# Patient Record
Sex: FEMALE | Race: BLACK OR AFRICAN AMERICAN | NOT HISPANIC OR LATINO | Employment: UNEMPLOYED | URBAN - METROPOLITAN AREA
[De-identification: names, ages, dates, MRNs, and addresses within clinical notes are randomized per-mention and may not be internally consistent; named-entity substitution may affect disease eponyms.]

---

## 2017-05-14 ENCOUNTER — HISTORICAL (OUTPATIENT)
Dept: ADMINISTRATIVE | Facility: HOSPITAL | Age: 27
End: 2017-05-14

## 2017-08-17 ENCOUNTER — HISTORICAL (OUTPATIENT)
Dept: ADMINISTRATIVE | Facility: HOSPITAL | Age: 27
End: 2017-08-17

## 2017-08-20 LAB — FINAL CULTURE: NO GROWTH

## 2017-09-01 ENCOUNTER — HISTORICAL (OUTPATIENT)
Dept: ADMINISTRATIVE | Facility: HOSPITAL | Age: 27
End: 2017-09-01

## 2017-09-03 LAB — FINAL CULTURE: NO GROWTH

## 2017-09-15 ENCOUNTER — HISTORICAL (OUTPATIENT)
Dept: ADMINISTRATIVE | Facility: HOSPITAL | Age: 27
End: 2017-09-15

## 2017-09-17 LAB — FINAL CULTURE: NO GROWTH

## 2018-02-10 ENCOUNTER — HISTORICAL (OUTPATIENT)
Dept: ADMINISTRATIVE | Facility: HOSPITAL | Age: 28
End: 2018-02-10

## 2018-03-13 ENCOUNTER — HISTORICAL (OUTPATIENT)
Dept: ADMINISTRATIVE | Facility: HOSPITAL | Age: 28
End: 2018-03-13

## 2018-04-30 ENCOUNTER — HISTORICAL (OUTPATIENT)
Dept: ADMINISTRATIVE | Facility: HOSPITAL | Age: 28
End: 2018-04-30

## 2018-05-02 LAB — FINAL CULTURE: NORMAL

## 2018-07-29 ENCOUNTER — HISTORICAL (OUTPATIENT)
Dept: ADMINISTRATIVE | Facility: HOSPITAL | Age: 28
End: 2018-07-29

## 2018-08-03 ENCOUNTER — HISTORICAL (OUTPATIENT)
Dept: ADMINISTRATIVE | Facility: HOSPITAL | Age: 28
End: 2018-08-03

## 2018-08-10 ENCOUNTER — HISTORICAL (OUTPATIENT)
Dept: ADMINISTRATIVE | Facility: HOSPITAL | Age: 28
End: 2018-08-10

## 2018-08-24 ENCOUNTER — HISTORICAL (OUTPATIENT)
Dept: ADMINISTRATIVE | Facility: HOSPITAL | Age: 28
End: 2018-08-24

## 2018-08-29 ENCOUNTER — HISTORICAL (OUTPATIENT)
Dept: ADMINISTRATIVE | Facility: HOSPITAL | Age: 28
End: 2018-08-29

## 2018-09-24 ENCOUNTER — HISTORICAL (OUTPATIENT)
Dept: ADMINISTRATIVE | Facility: HOSPITAL | Age: 28
End: 2018-09-24

## 2018-10-24 ENCOUNTER — HISTORICAL (OUTPATIENT)
Dept: ADMINISTRATIVE | Facility: HOSPITAL | Age: 28
End: 2018-10-24

## 2018-11-24 ENCOUNTER — HISTORICAL (OUTPATIENT)
Dept: ADMINISTRATIVE | Facility: HOSPITAL | Age: 28
End: 2018-11-24

## 2019-03-23 ENCOUNTER — HISTORICAL (OUTPATIENT)
Dept: ADMINISTRATIVE | Facility: HOSPITAL | Age: 29
End: 2019-03-23

## 2019-09-25 ENCOUNTER — HISTORICAL (OUTPATIENT)
Dept: ADMINISTRATIVE | Facility: HOSPITAL | Age: 29
End: 2019-09-25

## 2019-12-14 ENCOUNTER — HISTORICAL (OUTPATIENT)
Dept: ADMINISTRATIVE | Facility: HOSPITAL | Age: 29
End: 2019-12-14

## 2019-12-17 ENCOUNTER — HISTORICAL (OUTPATIENT)
Dept: ADMINISTRATIVE | Facility: HOSPITAL | Age: 29
End: 2019-12-17

## 2020-08-25 ENCOUNTER — HISTORICAL (OUTPATIENT)
Dept: ADMINISTRATIVE | Facility: HOSPITAL | Age: 30
End: 2020-08-25

## 2021-01-19 ENCOUNTER — HISTORICAL (OUTPATIENT)
Dept: ADMINISTRATIVE | Facility: HOSPITAL | Age: 31
End: 2021-01-19

## 2021-01-21 LAB — FINAL CULTURE: NORMAL

## 2021-06-21 LAB
PAP RECOMMENDATION EXT: NORMAL
PAP SMEAR: NORMAL

## 2021-06-26 ENCOUNTER — HISTORICAL (OUTPATIENT)
Dept: ADMINISTRATIVE | Facility: HOSPITAL | Age: 31
End: 2021-06-26

## 2021-06-26 LAB
ABS NEUT (OLG): 3.54 X10(3)/MCL (ref 2.1–9.2)
ALBUMIN SERPL-MCNC: 2.7 GM/DL (ref 3.5–5)
ALBUMIN/GLOB SERPL: 0.8 RATIO (ref 1.1–2)
ALP SERPL-CCNC: 58 UNIT/L (ref 40–150)
ALT SERPL-CCNC: 64 UNIT/L (ref 0–55)
APPEARANCE, UA: ABNORMAL
AST SERPL-CCNC: 30 UNIT/L (ref 5–34)
B-HCG SERPL QL: POSITIVE
BACTERIA SPEC CULT: ABNORMAL /HPF
BASOPHILS # BLD AUTO: 0 X10(3)/MCL (ref 0–0.2)
BASOPHILS NFR BLD AUTO: 0 %
BILIRUB SERPL-MCNC: 0.3 MG/DL
BILIRUB UR QL STRIP: NEGATIVE
BILIRUBIN DIRECT+TOT PNL SERPL-MCNC: 0.1 MG/DL (ref 0–0.5)
BILIRUBIN DIRECT+TOT PNL SERPL-MCNC: 0.2 MG/DL (ref 0–0.8)
BUN SERPL-MCNC: 4.2 MG/DL (ref 7–18.7)
CALCIUM SERPL-MCNC: 9 MG/DL (ref 8.4–10.2)
CHLORIDE SERPL-SCNC: 106 MMOL/L (ref 98–107)
CO2 SERPL-SCNC: 18 MMOL/L (ref 22–29)
COLOR UR: ABNORMAL
CREAT SERPL-MCNC: 0.59 MG/DL (ref 0.55–1.02)
EOSINOPHIL # BLD AUTO: 0.2 X10(3)/MCL (ref 0–0.9)
EOSINOPHIL NFR BLD AUTO: 3 %
ERYTHROCYTE [DISTWIDTH] IN BLOOD BY AUTOMATED COUNT: 14.4 % (ref 11.5–17)
EST CREAT CLEARANCE SER (OHS): 126.6 ML/MIN
GLOBULIN SER-MCNC: 3.5 GM/DL (ref 2.4–3.5)
GLUCOSE (UA): NEGATIVE
GLUCOSE SERPL-MCNC: 124 MG/DL (ref 74–100)
HCT VFR BLD AUTO: 34.1 % (ref 37–47)
HGB BLD-MCNC: 11.2 GM/DL (ref 12–16)
HGB UR QL STRIP: NEGATIVE
KETONES UR QL STRIP: ABNORMAL
LDH SERPL-CCNC: 145 UNIT/L (ref 140–271)
LEUKOCYTE ESTERASE UR QL STRIP: ABNORMAL
LYMPHOCYTES # BLD AUTO: 0.8 X10(3)/MCL (ref 0.6–4.6)
LYMPHOCYTES NFR BLD AUTO: 16 %
MCH RBC QN AUTO: 26.1 PG (ref 27–31)
MCHC RBC AUTO-ENTMCNC: 32.8 GM/DL (ref 33–36)
MCV RBC AUTO: 79.5 FL (ref 80–94)
MONOCYTES # BLD AUTO: 0.3 X10(3)/MCL (ref 0.1–1.3)
MONOCYTES NFR BLD AUTO: 5 %
NEUTROPHILS # BLD AUTO: 3.54 X10(3)/MCL (ref 2.1–9.2)
NEUTROPHILS NFR BLD AUTO: 74 %
NITRITE UR QL STRIP: NEGATIVE
PH UR STRIP: 6.5 [PH] (ref 5–9)
PLATELET # BLD AUTO: 232 X10(3)/MCL (ref 130–400)
PMV BLD AUTO: 9.9 FL (ref 9.4–12.4)
POTASSIUM SERPL-SCNC: 3.4 MMOL/L (ref 3.5–5.1)
PROT SERPL-MCNC: 6.2 GM/DL (ref 6.4–8.3)
PROT UR QL STRIP: ABNORMAL
RBC # BLD AUTO: 4.29 X10(6)/MCL (ref 4.2–5.4)
RBC #/AREA URNS HPF: ABNORMAL /[HPF]
SODIUM SERPL-SCNC: 135 MMOL/L (ref 136–145)
SP GR UR STRIP: 1.02 (ref 1–1.03)
SQUAMOUS EPITHELIAL, UA: 13 /HPF (ref 0–4)
UROBILINOGEN UR STRIP-ACNC: 1
WBC # SPEC AUTO: 4.8 X10(3)/MCL (ref 4.5–11.5)
WBC #/AREA URNS HPF: 7 /HPF (ref 0–3)

## 2021-06-28 LAB — FINAL CULTURE: NORMAL

## 2022-04-07 ENCOUNTER — HISTORICAL (OUTPATIENT)
Dept: ADMINISTRATIVE | Facility: HOSPITAL | Age: 32
End: 2022-04-07
Payer: MEDICAID

## 2022-04-20 ENCOUNTER — HISTORICAL (OUTPATIENT)
Dept: RADIOLOGY | Facility: HOSPITAL | Age: 32
End: 2022-04-20
Payer: MEDICAID

## 2022-04-23 VITALS
SYSTOLIC BLOOD PRESSURE: 123 MMHG | BODY MASS INDEX: 40.11 KG/M2 | WEIGHT: 240.75 LBS | OXYGEN SATURATION: 100 % | HEIGHT: 65 IN | DIASTOLIC BLOOD PRESSURE: 88 MMHG

## 2022-04-30 NOTE — H&P
Patient:   Sampson Ly            MRN: [id number]            FIN: [id number]               Age:   27 years     Sex:  Female     :  1990   Associated Diagnoses:   None   Author:   Aristides Galindo MD      Basic Information   Time Seen:  Date & Time 2018 01:55:00.    Source of history:  Self.    Referral source:  Emergency department.    History limitation:  None.    Advance directive:  Full code.    Provider information/ cc:  Nonstaff.       Chief Complaint   Shortness of breath and wheezing      History of Present Illness   Mrs. Ly is a 28 yo AAF with pmhx of asthma and seasonal allergies presents to the ED with c/o acute onset of dyspnea with associated wheezing around 11 PM tonight.  Patient reports using her home nebulizer with no relief so she called EMS.  She denies fever, chills, chest pain, cough sick contacts or recent hospitalizations.When EMS arrived patient sats were 91%.  She was given 0.3 of epinephrine along with steroids en route.   Initial ED vital signs:Temperature 98.6, heart rate 132, respirations 29, oxygenation 100% on aerosol mask.  She was given an hour-long nebulizer treatment, 1 mg of epinephrine, magnesium sulfate and Solu-Medrol with some improvement in her oxygenation and work of breathing.  ABGs unremarkable except a PO2 of 155.  Chest x-ray negative for acute cardiopulmonary process.  Patient is admitted to the hospitalist service for treatment of her asthma exacerbation.       Review of Systems   Except as documented, all other systems reviewed and negative      Health Status   Allergies:    Allergic Reactions (Selected)  Severity Not Documented  Egg- Unknown.  Iodine- Sob.  Mobic- Hives.  Peanuts- No reactions were documented.  Seafood- Sob.  TraZODone- Tachycardia.,    Allergies (6) Active Reaction  Egg unknown  iodine sob  Mobic Hives  Peanuts None Documented  Seafood sob  traZODone tachycardia     Current medications:  (Selected)   Inpatient  Medications  Ordered  IVF Normal Saline NS Infusion 1,000 mL: 1,000 mL, 1,000 mL, IV, 500 mL/hr, start date 18 23:35:00 CDT  Solumedrol IV push / IM: 125 mg, form: Injection, IV Push, [id number], first dose 18 23:35:00 CDT  Toradol 30 mg for IV Push: 30 mg, form: Injection, IV Push, Once, first dose 16 19:03:00 CDT, stop date 16 19:03:00 CDT, STAT, 24  Prescriptions  Prescribed  amitriptyline 25 mg oral tablet: 25 mg = 1 tab(s), Oral, Once a day (at bedtime), To help prevent headaches., # 30 tab(s), 0 Refill(s)  Documented Medications  Documented  AZITHROMYCIN 500 MG TABLET:   AZITHROMYCIN TAB 250MG: Oral, As Directed  BACLOFEN     TAB 10MG: 10 mg = 1 tab(s), Oral, TID  BUT/APAP/CAF TAB:   CETIRIZINE   TAB 10MG: 10 mg = 1 tab(s), Oral, Daily  FERROUS SULF TAB 325M mg = 1 tab(s), Oral, Daily  FLOVENT HFA  AER 110MCG:   FLUTICASONE  SPR 50MCG:   HYDROCO/APAP TAB 5-325MG:   HYDROCO/APAP TAB 7.5-325: 1 tab(s), Oral, QID  NAPROXEN     TAB 500M mg = 1 tab(s), Oral, BID  PANTOPRAZOLE TAB 40M mg = 1 tab(s), Oral, Daily  PREDNISONE   TAB 20MG:   PREDNISONE   TAB 50M mg = 1 tab(s), Oral, Daily  PREDNISONE 10MG TABLET:   PROCHLORPER  TAB 10MG: 10 mg = 1 tab(s), Oral, q6hr  PROMETHAZINE SYP DM: 5 mL, Oral, q6hr  TRIAMCINOLON AER 55MCG/AC:   VENTOLIN HFA AER:       Histories     Past Medical History: Obesity, asthma, seasonal allergies  Past Surgical History: Negative  Family History: Reviewed and noncontributory  Social History:  No alcohol, tobacco or illicit drug use.       Physical Examination      Vital Signs (last 24 hrs)_____  Last Charted___________  Temp Oral     37 DegC  (APR 29 23:28)  Heart Rate Peripheral   H 102bpm  ()  Resp Rate         16 br/min  ()  SBP      120 mmHg  ()  DBP      90 mmHg  ()  SpO2      100 %  ()     General:  Alert and oriented, -American female appears stated age in mild distress,  tachypnea only able to talk in 2-3 word sentences..    Cognition and Speech:  Oriented, Speech clear and coherent.    HENT:  Normocephalic, Normal hearing, Oral mucosa is moist.    Eye:  Pupils are equal, round and reactive to light, Normal conjunctiva.    Neck:  Supple, No carotid bruit, No jugular venous distention.    Respiratory:  Respirations are non-labored, Breath sounds are equal, Diminished with expiratory wheezing noted throughout, Tachypenic.    Cardiovascular:  Normal rate, Regular rhythm, No murmur, Good pulses equal in all extremities, Normal peripheral perfusion, No edema.    Gastrointestinal:  Soft, Non-tender, Non-distended, Normal bowel sounds.    Integumentary:  Warm, Dry, Intact.    Musculoskeletal:  Normal strength, No tenderness, No swelling.    Neurologic:  Alert, Oriented, Normal sensory, No focal deficits.    Psychiatric:  Cooperative, Appropriate mood & affect, Normal judgment.       Review / Management   Laboratory Results   Today's Lab Results : PowerNote Discrete Results   4/30/2018 1:15 CDT       UA Appear                 CLEAR                             UA Color                  YELLOW                             UA Spec Grav              1.015                             UA Bili                   Negative                             UA pH                     6.0                             UA Urobilinogen           1.0                             UA Blood                  Negative                             UA Glucose                Negative                             UA Ketones                Negative                             UA Protein                Negative                             UA Nitrite                Negative                             UA Leuk Est               1+                             UA WBC                    5 /HPF  HI                             UA RBC                    NONE SEEN                             UA Bacteria               1+ /HPF                              UA Squam Epithelial       NONE SEEN                             U Beta hCG Ql             Negative    4/30/2018 0:50 CDT       Sample ABG                ART                             Treatment                 NONREBREATHER                             Site                      Radial Rt                             pH Art                    7.370                             pCO2 Art                  41.0 mmHg                             pO2 Art                   155.0 mmHg  HI                             HCO3 Art                  23.7 mmol/L                             CO2 Totl Art              25.0 mmol/L                             O2 Sat Art                99.3 %  HI                             D base                    -1.5                             THB ABG                   10.0 gm/dL  LOW                             CO Hgb                    1.0 %  NA                             Met Hgb Art               1.0 %                             O2 Hgb                    96.7 %                             CaO2                      13.9 mL/dL  LOW                             Ionized Calcium           1.14 mmol/L                             Sodium RT                 139.0 mmol/L                             Potassium RT              2.80 mmol/L  LOW                             Allens                    N/A                             Setting 1 ABG             NONREBREATHER                             Setting 2 ABG             12L    4/30/2018 0:10 CDT       POC BNP iSTAT             21 pg/mL                             POC Troponin              0.00 ng/mL    4/29/2018 23:43 CDT      WBC                       7.1 x10(3)/mcL                             RBC                       4.64 x10(6)/mcL                             Hgb                       10.6 gm/dL  LOW                             Hct                       36.7 %  LOW                             Platelet                  281 x10(3)/mcL                              MCV                       79.1 fL  LOW                             MCH                       22.8 pg  LOW                             MCHC                      28.9 gm/dL  LOW                             RDW                       17.5 %  HI                             MPV                       9.9 fL                             Abs Neut                  3.51 x10(3)/mcL                             Neutro Auto               49 %  NA                             Lymph Auto                34 %  NA                             Mono Auto                 9 %  NA                             Eos Auto                  7 %  NA                             Abs Eos                   0.5 x10(3)/mcL                             Basophil Auto             0 %  NA                             Abs Neutro                3.51 x10(3)/mcL                             Abs Lymph                 2.4 x10(3)/mcL                             Abs Mono                  0.6 x10(3)/mcL                             Abs Baso                  0.0 x10(3)/mcL                             Sodium Lvl                142 mmol/L                             Potassium Lvl             3.7 mmol/L                             Chloride                  111 mmol/L  HI                             CO2                       26.0 mmol/L                             Calcium Lvl               8.6 mg/dL                             Glucose Lvl               108 mg/dL  HI                             BUN                       9.0 mg/dL                             Creatinine                0.75 mg/dL                             eGFR-AA                   >60 mL/min/1.73 m2  NA                             eGFR-PAL                  >60 mL/min/1.73 m2  NA                             Bili Total                0.3 mg/dL                             Bili Direct               0.10 mg/dL                             Bili Indirect             0.20 mg/dL                              AST                       13 unit/L  LOW                             ALT                       20 unit/L                             Alk Phos                  48 unit/L                             Total Protein             7.2 gm/dL                             Albumin Lvl               3.80 gm/dL                             Globulin                  3.40 gm/dL                             A/G Ratio                 1.1  NA        Radiology results   Rad Results (ST)          Impression and Plan   Acute Asthma Exacerbation  - Duonebs Q4H  - IV Solumedrol 60mg IVP Q8H  - 02 therapy to keep sats >92%    Obesity  - counseled on diet and exercise    IFernanda FNP-C have reviewed and disussed this case with     Code status: Full  DVT prophylaxis: Lovenox  Admission time 75 minutes.     For this patient encounter, I reviewed the mid-level provider's documentation, treatment plan, medical decision-making and I agree with all the above.  I have had face-to-face time with this patient on April 30th 2018 at 0200.  This patient is being admitted to the hospital for acute asthma exacerbation.  She will be treated with nebulizers and methylprednisolone.  Daily peak flow measurements

## 2022-04-30 NOTE — ED PROVIDER NOTES
Patient:   Sampson Ly            MRN: [id number]            FIN: [id number]               Age:   27 years     Sex:  Female     :  1990   Associated Diagnoses:   Acute viral syndrome   Author:   Tea Hahn      Basic Information   Time seen: Date & time 9/15/2017 11:40:00.   History source: Patient.   Arrival mode: Private vehicle, walking.   History limitation: None.   Additional information: Chief Complaint from Nursing Triage Note : Chief Complaint   9/15/2017 11:37 CDT      Chief Complaint           pt to er c/o nausea, vomiting and bodyaches onset this morning.  .      History of Present Illness   The patient presents with nausea, vomiting and Body aches, nasal congestion.  The onset was 1  days ago.  The course/duration of symptoms is episodic: 5  total episodes and fluctuating in intensity.  The degree at present is moderate.  The exacerbating factor is none.  The relieving factor is none.  Risk factors consist of none.  Therapy today: none.  Associated symptoms: fever (Subjective), denies abdominal pain, denies diarrhea, denies chills, denies chest pain, denies shortness of breath, denies headache, denies dizziness, denies back pain and denies blood in stool.        Review of Systems   Constitutional symptoms:  No fever,    Skin symptoms:  No rash,    Eye symptoms:  Negative except as documented in HPI.   ENMT symptoms:  Nasal congestion, No sore throat,    Respiratory symptoms:  Cough.   Cardiovascular symptoms:  Negative except as documented in HPI.   Gastrointestinal symptoms:  Nausea, vomiting, No diarrhea,    Genitourinary symptoms:  No dysuria,    Musculoskeletal symptoms:  No back pain,    Neurologic symptoms:  No headache,    Psychiatric symptoms:  Negative except as documented in HPI.   Endocrine symptoms:  Negative except as documented in HPI.   Hematologic/Lymphatic symptoms:  Negative except as documented in HPI.   Allergy/immunologic symptoms:  Negative except  as documented in HPI.             Additional review of systems information: All other systems reviewed and otherwise negative.      Health Status   Allergies:    Allergic Reactions (Selected)  Severity Not Documented  Iodine- Sob.  Mobic- Hives.  Peanuts- No reactions were documented.  Seafood- Sob.  TraMADol- Swelling of throat.,    Allergies (5) Active Reaction  iodine sob  Mobic Hives  Peanuts None Documented  Seafood sob  traMADol swelling of throat  .   Medications:  (Selected)   Inpatient Medications  Ordered  Toradol 30 mg for IV Push: 30 mg, form: Injection, IV Push, Once, first dose 03/31/16 19:03:00 CDT, stop date 03/31/16 19:03:00 CDT, STAT, 24  Prescriptions  Prescribed  albuterol 2.5 mg/3 mL (0.083%) inhalation solution: 2.5 mg = 3 mL, INH, q6hr, # 30 EA, 0 Refill(s)  albuterol CFC free 90 mcg/inh inhalation aerosol with adapter: 2 puff(s), INH, QID, PRN PRN cough or wheezing, # 1 EA, 0 Refill(s), per nurse's notes.   Menstrual history: Last menstrual period: Date 9/1/2017.      Past Medical/ Family/ Social History   Medical history:    Active  Asthma ([id number]), Reviewed as documented in chart.   Surgical history:    No active procedure history items have been selected or recorded., Reviewed as documented in chart.   Family history:    No family history items have been selected or recorded..   Social history: Reviewed as documented in chart.   Problem list:    Active Problems (5)  ACUTE PYELONEPHRITIS   Asthma   Knowledge deficit   Pain, flank, bilateral   Tobacco user   .      Physical Examination               Vital Signs   Vital Signs   9/15/2017 11:37 CDT      Temperature Oral          36.0 DegC                             Peripheral Pulse Rate     85 bpm                             Respiratory Rate          18 br/min                             SpO2                      99 %                             Oxygen Therapy            Room air                             Systolic Blood Pressure   125  mmHg                             Diastolic Blood Pressure  80 mmHg  .      Vital Signs (last 24 hrs)_____  Last Charted___________  Temp Oral     36.0 DegC  (SEP 15 11:37)  Heart Rate Peripheral   85 bpm  (SEP 15 11:37)  Resp Rate         18 br/min  (SEP 15 11:37)  SBP      125 mmHg  (SEP 15 11:37)  DBP      80 mmHg  (SEP 15 11:37)  SpO2      99 %  (SEP 15 11:37)  .   Measurements   9/15/2017 11:37 CDT      Weight Dosing             96 kg                             Weight Measured and Calculated in Lbs     211.64 lb                             Weight Estimated          96 kg                             Height/Length Dosing      165.10 cm                             Height/Length Estimated   165.10 cm                             Body Mass Index Estimated 35.22 kg/m2  .   Basic Oxygen Information   9/15/2017 11:37 CDT      SpO2                      99 %                             Oxygen Therapy            Room air  .   General:  Alert, no acute distress.    Skin:  Warm, dry, pink, intact.    Head:  Normocephalic, atraumatic.    Neck:  Supple, trachea midline.    Eye:  Pupils are equal, round and reactive to light, extraocular movements are intact.    Ears, nose, mouth and throat:  Tympanic membranes clear, oral mucosa moist, no pharyngeal erythema or exudate, Nose: Moderate, congestion.    Cardiovascular:  Regular rate and rhythm, No murmur, Normal peripheral perfusion.    Respiratory:  Lungs are clear to auscultation, respirations are non-labored, breath sounds are equal, Symmetrical chest wall expansion.    Gastrointestinal:  Soft, Nontender, Non distended, Normal bowel sounds, Guarding: Negative, Rebound: Negative.    Neurological:  Alert and oriented to person, place, time, and situation, No focal neurological deficit observed, CN II-XII intact, normal sensory observed, normal motor observed, normal speech observed.    Psychiatric:  Cooperative, appropriate mood & affect, normal judgment.       Medical Decision  Making   Documents reviewed:  Emergency department nurses' notes.   Orders  Launch Orders   Laboratory:  Influenza A&B Antigen (Order): Stat collect, Nares, 9/15/2017 11:50 CDT, Nurse collect, Print Label By Order Location, 9/15/2017 11:50 CDT  UA Total a reflex to culture (Order): Stat collect, Urine, 9/15/2017 11:50 CDT, Nurse collect  CMP (Order): Stat collect, 9/15/2017 11:49 CDT, Blood, Lab Collect, 9/15/2017 11:49 CDT  CBC w/ Auto Diff (Order): Now collect, 9/15/2017 11:49 CDT, Blood, Lab Collect, 9/15/2017 11:49 CDT  Patient Care:  Saline Lock Insert (Order): 9/15/2017 11:49 CDT  Pharmacy:  Zofran 2 mg/mL injectable solution (Order): 4 mg, form: Injection, IV Push, Once, first dose 9/15/2017 12:00 CDT, stop date 9/15/2017 12:00 CDT, 24  NS (0.9% Sodium Chloride) Infusion 1000 mL (Order): 1,000 mL, 1,000 mL, IV, 1,000 mL/hr, start date 9/15/2017 11:49 CDT, Launch Orders   Pharmacy:  ketorolac (Order): 30 mg, form: Injection, IV Push, Once, first dose 9/15/2017 14:06 CDT, stop date 9/15/2017 14:06 CDT, 24.    Results review:  Lab results : Lab View   9/15/2017 13:55 CDT      UA Appear                 Hazy                             UA Color                  Yellow                             UA Spec Grav              1.005                             UA Bili                   Negative                             UA pH                     8.0  HI                             UA Urobilinogen           Negative                             UA Blood                  Negative                             UA Glucose                Negative                             UA Ketones                Negative                             UA Protein                Negative                             UA Nitrite                Negative                             UA Leuk Est               1+                             UA WBC                    5-10 /HPF                             UA RBC                    0                              UA Bacteria               Rare /HPF                             UA Squam Epithelial       Moderate /LPF    9/15/2017 11:55 CDT      Sodium Lvl                141 mmol/L                             Potassium Lvl             3.6 mmol/L                             Chloride                  109 mmol/L  HI                             CO2                       26.0 mmol/L                             Calcium Lvl               9.1 mg/dL                             Glucose Lvl               104 mg/dL                             BUN                       6.0 mg/dL  LOW                             Creatinine                0.74 mg/dL                             eGFR-AA                   >60 mL/min/1.73 m2  NA                             eGFR-PAL                  >60 mL/min/1.73 m2  NA                             Bili Total                0.7 mg/dL                             Bili Direct               0.10 mg/dL                             Bili Indirect             0.60 mg/dL                             AST                       14 unit/L  LOW                             ALT                       17 unit/L                             Alk Phos                  60 unit/L                             Total Protein             8.2 gm/dL                             Albumin Lvl               3.9 gm/dL                             Globulin                  4 gm/dL                             A/G Ratio                 1  NA                             WBC                       6.4 x10(3)/mcL                             RBC                       4.69 x10(6)/mcL                             Hgb                       10.3 gm/dL  LOW                             Hct                       34.7 %  LOW                             Platelet                  258 x10(3)/mcL                             MCV                       74.0 fL  LOW                             MCH                       22.0 pg  LOW                             MCHC                       29.7 gm/dL  LOW                             RDW                       18.0 %  HI                             MPV                       10.0 fL                             Abs Neut                  5.54 x10(3)/mcL                             Neutro Auto               85.9 %  HI                             Lymph Auto                5.3 %  LOW                             Mono Auto                 4.7 %                             Eos Auto                  3.6 %                             Abs Eos                   0.23 x10(3)/mcL                             Basophil Auto             0.2 %                             Abs Neutro                5.54 x10(3)/mcL                             Abs Lymph                 0.34 x10(3)/mcL  LOW                             Abs Mono                  0.30 x10(3)/mcL                             Abs Baso                  0.01 x10(3)/mcL                             NRBC%                     0.0 %                             IG%                       0.300 %                             IG#                       0.0200  HI                             Platelet Est              Adequate                             Anisocyte                 1+                             Microcyte                 2+                             RBC Morph                 Abnormal                             Ovalocytes                2+                             [place] Cells                1+                             Influ A Ag                Negative                             Influ B Ag                Negative    9/15/2017 11:45 CDT      UA Appear                 CLOUDY                             UA Color                  YELLOW                             UA Spec Grav              1.025                             UA Bili                   Negative                             UA pH                     7.0                             UA Urobilinogen           1.0                              UA Blood                  Negative                             UA Glucose                Negative                             UA Ketones                Negative                             UA Protein                Negative                             UA Nitrite                Negative                             UA Leuk Est               2+                             UA WBC                    53 /HPF  HI                             UA RBC                    None Seen                             UA Bacteria               1+ /HPF                             UA Squam Epithelial       Moderate  .      Reexamination/ Reevaluation   Time: 9/15/2017 14:30:00 .   Course: improving.      Impression and Plan   Diagnosis   Acute viral syndrome (VGP22-ML B34.9)   Plan   Condition: Improved.    Disposition: Discharged: Time  9/15/2017 14:33:00, to home.    Prescriptions: Launch prescriptions   Pharmacy:  Zofran ODT 4 mg oral tablet, disintegrating (Prescribe): 4 mg = 1 tab(s), Oral, TID, X 3 day(s), # 9 tab(s), 0 Refill(s)  Cipro 500 mg oral tablet (Prescribe): 500 mg = 1 tab(s), Oral, q12hr, X 3 day(s), # 6 tab(s), 0 Refill(s).    Patient was given the following educational materials: Viral Respiratory Infection, Urinary Tract Infection.    Follow up with: Follow-up with a primary care provider if symptoms are not improving in the next 3-5 days.  Return to the emergency department for any worsening or concerning symptoms..    Counseled: Patient, Regarding diagnosis, Regarding diagnostic results, Regarding treatment plan, Regarding prescription, Patient indicated understanding of instructions.

## 2022-04-30 NOTE — ED PROVIDER NOTES
"   Patient:   Sampson Ly            MRN: [id number]            FIN: [id number]               Age:   30 years     Sex:  Female     :  1990   Associated Diagnoses:   Nausea and vomiting during pregnancy; UTI in pregnancy; Hyperemesis gravidarum; Headache; Dysuria; Dizziness   Author:   Yahir Bueno MD      Report Summary       General:       Alert, no acute distress.       Basic Information   Time seen: Date & time 2021 11:53:00.   History source: Patient.   Arrival mode: Walking.   History limitation: None.   Additional information: Patient's physician(s): Dr. Ajit JOHNSON MD  , Chief Complaint from Nursing Triage Note : Chief Complaint   2021 11:43 CDT      Chief Complaint           18wks pregnant, , c/o headache and dizziness x1 week. states she has not had felt fetal movement since Wednesday. denies vaginal bleeding or abd pain. denies blurry vision, FAST negative  .      History of Present Illness   The patient presents with 31 y/o female who presents with headache and dizziness for a week. also hasn't felt fetal movement since wednesday. approx 18weeks gestation. dee tamayo and     VIVIEN, Dr. Bueno, assumed care of this patient at 1231.    31 y/o AAF presents to the ED with complaints of headaches, vomiting, and dizziness for the past week. She reports her headache is located at the frontal and temporal lobe. Pt is currently 18weeks pregnant. She says 2 days after finding out she was pregnant, she was vomiting for 3 weeks. She reports not feeling any fetal movement since 21. She notes having a "tight" pelvic pain with pain when urinating. .  The onset was 1  weeks ago.  The course/duration of symptoms is constant.  Location: Frontal temporal. Radiating pain: none. The character of symptoms is tightness.  The degree at onset was moderate.  The degree at maximum was moderate.  The degree at present is minimal.  The exacerbating factor is none.  The relieving " factor is light avoidance.  Risk factors consist of none.  Prior episodes: none.  Therapy today: none.  Preceding symptoms: none.  Associated symptoms: nausea, vomiting and dizziness.        Review of Systems   Constitutional symptoms:  Negative except as documented in HPI   Skin symptoms:  Negative except as documented in HPI   Eye symptoms:  Negative except as documented in HPI   ENMT symptoms:  Negative except as documented in HPI.   Respiratory symptoms:  Negative except as documented in HPI   Cardiovascular symptoms:  Negative except as documented in HPI   Gastrointestinal symptoms:  Abdominal pain, moderate, pelvic, pain, nausea, vomiting   Genitourinary symptoms:  Negative except as documented in HPI.   Musculoskeletal symptoms:  Negative except as documented in HPI.   Neurologic symptoms:     Psychiatric symptoms:  Negative except as documented in HPI.   Endocrine symptoms:  Negative except as documented in HPI.   Hematologic/Lymphatic symptoms:  Negative except as documented in HPI   Allergy/immunologic symptoms:  Negative except as documented in HPI      Health Status   Allergies:    Allergic Reactions (Selected)  High  Ultram- Anaphylaxis.  Severity Not Documented  Iodine- Sob.  Mobic- Hives.  Peanuts- No reactions were documented.  Robaxin- Anaphylaxis/throat closes.  Seafood- Sob.  Toradol- Ithcing.  TraZODone- Tachycardia..   Medications:  (Selected)   Inpatient Medications  Ordered  Normal Saline Infusion 1,000 mL: 1,000 mL, 1,000 mL, IV, 1,000 mL/hr, start date 06/26/21 11:52:00 CDT, 2.11, m2  Tylenol: 1,000 mg, form: Tab, Oral, Once, first dose 06/26/21 11:51:00 CDT, stop date 06/26/21 11:51:00 CDT, STAT  Tylenol: 1,000 mg, form: Tab, Oral, Once, first dose 09/10/19 9:32:00 CDT, stop date 09/10/19 9:32:00 CDT, STAT  Prescriptions  Prescribed  baclofen 10 mg oral tablet: 10 mg = 1 tab(s), Oral, TID, PRN PRN as needed for muscle spasm, # 20 tab(s), 0 Refill(s), Pharmacy: [id number], 206,  cm, Height/Length Dosing, 20 22:34:00 CDT, 106.35, kg, Weight Dosing, 20 22:34:00 CDT  Documented Medications  Documented  Dupixent 300 mg/2 mL subcutaneous solution: 300 mg, Subcutaneous  Symbicort 80 mcg-4.5 mcg/inh inhalation aerosol: 2 puff(s), INH, BID  albuterol CFC free 90 mcg/inh inhalation aerosol with adapter: 2 puff(s), INH, QID  fluticasone 50 mcg/inh nasal spray: 1 spray(s), Nasal, Daily  hydrOXYzine hydrochloride 25 mg oral tablet: 25 mg = 1 tab(s), Oral, QID  hydrocortisone topical 2.5% cream: 1 thong, TOP, TID  levocetirizine 5 mg oral tablet: 5 mg = 1 tab(s), Oral, qPM  loratadine 10 mg oral tablet: 10 mg = 1 tab(s), Oral, Daily.   Delivery History: .      Past Medical/ Family/ Social History   Medical history:    Active  Eczema ([id number])  Resolved  Asthma ([id number]):  Resolved..   Surgical history:    none..   Family history:    No family history items have been selected or recorded..   Social history: Tobacco use: Denies, Occupation: Unemployed, Family/social situation: Unmarried.      Physical Examination               Vital Signs   Vital Signs   2021 11:43 CDT      Temperature Oral          36.7 DegC                             Temperature Oral (calculated)             98.06 DegF                             Peripheral Pulse Rate     103 bpm  HI                             Respiratory Rate          18 br/min                             SpO2                      97 %                             Oxygen Therapy            Room air                             Systolic Blood Pressure   134 mmHg                             Diastolic Blood Pressure  83 mmHg                             Mean Arterial Pressure, Cuff              100 mmHg  .   Measurements   2021 11:43 CDT      Weight Dosing             106 kg                             Weight Measured and Calculated in Lbs     233.69 lb                             Weight Estimated          106 kg                              Height/Length Dosing      165.10 cm                             Height/Length Estimated   165.10 cm                             Body Mass Index Estimated 38.89 kg/m2  .   Basic Oxygen Information   6/26/2021 11:43 CDT      SpO2                      97 %                             Oxygen Therapy            Room air  .   General:  Alert, no acute distress   Neurological:  Alert and oriented to person, place, time, and situation, No focal neurological deficit observed, CN II-XII intact, normal sensory observed, normal motor observed, normal speech observed   Skin:  Warm, dry, no rash   Head:  Normocephalic, atraumatic   Neck:  Supple, trachea midline, no JVD   Eye:  Pupils are equal, round and reactive to light, extraocular movements are intact, normal conjunctiva, vision unchanged   Ears, nose, mouth and throat:  Tympanic membranes clear, no pharyngeal erythema or exudate, Dry mucous membranes   Cardiovascular:  Regular rate and rhythm, No murmur, No edema, Tachycardia   Respiratory:  Lungs are clear to auscultation, respirations are non-labored, breath sounds are equal, Symmetrical chest wall expansion.    Chest wall:  No tenderness   Back:  Normal range of motion   Musculoskeletal:  Normal ROM, normal strength, no tenderness   Gastrointestinal:  Soft, Nontender, Non distended, Normal bowel sounds, No organomegaly, Gravid   Psychiatric:  Cooperative, appropriate mood & affect, normal judgment            Medical Decision Making   Differential Diagnosis:  Migraine, tension headache, preeclampsia.    Rationale:  30-year-old female G3, P2 here about 18 weeks pregnant for mild headache that has frontotemporal for the last several days that was preceded by several days of nausea vomiting not been taking anything at home and chemotherapy soon.  Denies any abdominal pain or vaginal bleeding but does note dysuria.  Very well-appearing on exam but tachycardic into the 130s, normotensive afebrile and with normal sats on room  air.  On exam no abdominal tenderness dry oral mucosa so started on IV fluids and antiemetics.  Given Tylenol with resolution of her headache as well.  Labs with mild hyponatremia and hypokalemia with reduced bicarb but normal kidney function, LDH negative and with normal blood pressure no extremity edema likely not headache for preeclampsia also although she does have only trace protein in her urine.  She does have also trace ketones we will continue hydration at home as well start Macrobid for some bacteriuria.  Point-of-care ultrasound done by me with good fetal heart tones and fetal movements with a single intrauterine pregnancy.  We will discharge home with instruction to follow-up with her OB in the next 1 to 2 weeks return with any worsening or acute on medications we will send Rx for doxylamine, vitamin B6 and Macrobid..   Documents reviewed:  Emergency department nurses' notes.   Orders  Launch Order Profile (Selected)   Inpatient Orders  Ordered  HT Screenin21 11:42:28 CDT  Normal Saline Infusion 1,000 mL: 1,000 mL, 1,000 mL, IV, 1,000 mL/hr, start date 21 11:52:00 CDT, 2.11, m2  Tylenol: 1,000 mg, form: Tab, Oral, Once, first dose 21 11:51:00 CDT, stop date 21 11:51:00 CDT, STAT  fetal heart tones: 21 11:51:00 CDT, fetal heart tones  Ordered (Dispatched)  Pregnancy Test Urine: Stat collect, Urine, 21 11:51:00 CDT, Stop date 21 11:52:00 CDT, Nurse collect, Print Label By Order Location, 21 11:51:00 CDT  Urinalysis with Reflex: Stat collect, Urine, 21 11:51:00 CDT, Stop date 21 11:52:00 CDT, Nurse collect, Print Label By Order Location  Ordered (In-Lab)  LDH: Stat collect, 21 12:40:00 CDT, Blood, Stop date 21 12:41:00 CDT, Lab Collect, Print Label By Order Location, 21 12:40:00 CDT  Completed  Automated Diff: NOW collect, 21 12:16:00 CDT, Blood, Collected, Stop date 21 12:16:00 CDT, Lab Collect, Print Label By Order  Location, 06/26/21 11:51:00 CDT  CBC w/ Auto Diff: NOW collect, 06/26/21 12:16:48 CDT, BLOOD, Collected, Stop date 06/26/21 12:16:00 CDT, Lab Collect  CMP: STAT collect, 06/26/21 12:16:48 CDT, BLOOD, Collected, Stop date 06/26/21 12:16:00 CDT, Lab Collect  Estimated Glomerular Filtration Rate: STAT collect, 06/26/21 12:16:00 CDT, Blood, Collected, Stop date 06/26/21 12:16:00 CDT, Lab Collect, Print Label By Order Location, 06/26/21 11:51:00 CDT.   Results review:  Lab results : Lab View   6/26/2021 13:25 CDT      U Beta hCG Ql             Positive                             UA Appear                 CLOUDY                             UA Color                  DK YELLOW                             UA Spec Grav              1.025                             UA Bili                   Negative                             UA pH                     6.5                             UA Urobilinogen           1.0                             UA Blood                  Negative                             UA Glucose                Negative                             UA Ketones                Trace                             UA Protein                Trace                             UA Nitrite                Negative                             UA Leuk Est               1+                             UA WBC                    7 /HPF  HI                             UA RBC                    None Seen                             UA Bacteria               1+ /HPF                             UA Squam Epithelial       13 /HPF  HI    6/26/2021 12:52 CDT      Est Creat Clearance Ser   126.60 mL/min    6/26/2021 12:41 CDT      LDH                       145 unit/L    6/26/2021 12:16 CDT      Sodium Lvl                135 mmol/L  LOW                             Potassium Lvl             3.4 mmol/L  LOW                             Chloride                  106 mmol/L                             CO2                       18 mmol/L  LOW                              Calcium Lvl               9.0 mg/dL                             Glucose Lvl               124 mg/dL  HI                             BUN                       4.2 mg/dL  LOW                             Creatinine                0.59 mg/dL                             eGFR-AA                   >60  NA                             eGFR-PAL                  >60 mL/min/1.73 m2  NA                             Bili Total                0.3 mg/dL                             Bili Direct               0.1 mg/dL                             Bili Indirect             0.20 mg/dL                             AST                       30 unit/L                             ALT                       64 unit/L  HI                             Alk Phos                  58 unit/L                             Total Protein             6.2 gm/dL  LOW                             Albumin Lvl               2.7 gm/dL  LOW                             Globulin                  3.5 gm/dL                             A/G Ratio                 0.8 ratio  LOW                             WBC                       4.8 x10(3)/mcL                             RBC                       4.29 x10(6)/mcL                             Hgb                       11.2 gm/dL  LOW                             Hct                       34.1 %  LOW                             Platelet                  232 x10(3)/mcL                             MCV                       79.5 fL  LOW                             MCH                       26.1 pg  LOW                             MCHC                      32.8 gm/dL  LOW                             RDW                       14.4 %                             MPV                       9.9 fL                             Abs Neut                  3.54 x10(3)/mcL                             Neutro Auto               74 %  NA                             Lymph Auto                16 %  NA                              Mono Auto                 5 %  NA                             Eos Auto                  3 %  NA                             Abs Eos                   0.2 x10(3)/mcL                             Basophil Auto             0 %  NA                             Abs Neutro                3.54 x10(3)/mcL                             Abs Lymph                 0.8 x10(3)/mcL                             Abs Mono                  0.3 x10(3)/mcL                             Abs Baso                  0.0 x10(3)/mcL  .      Reexamination/ Reevaluation   Vital signs   Basic Oxygen Information   6/26/2021 11:43 CDT      SpO2                      97 %                             Oxygen Therapy            Room air        Impression and Plan   Diagnosis   Nausea and vomiting during pregnancy (IIV27-KO O21.9)   UTI in pregnancy (TSW79-RR O23.40)   Hyperemesis gravidarum (IQO24-HJ O21.0)   Headache (PNED [id number])   Dysuria (ASM78-BB R30.0)   Dizziness (PNED [id number])   Plan   Condition: Improved, Stable.    Disposition: Discharged: to home.    Prescriptions: B6, doxylamine, Macrobid.    Patient was given the following educational materials: Tension Headache, Adult, Easy-to-Read, Urinary Tract Infection, Adult, Easy-to-Read, Hyperemesis Gravidarum.    Follow up with: Report to Emergency Department if symptoms return or worsen; Follow up with OB/GYN Call for followup appointment.    Counseled: Patient, Regarding diagnosis, Regarding diagnostic results, Regarding treatment plan, Patient indicated understanding of instructions.    Notes: Sandro PUGA, acted soley as a scribe for and in the prescence of  who performed the service. , Yahir PUGA M.D., personally performed the history, physical exam and medical decision making; and confirmed the accuracy of the information in the transcribed note.

## 2022-04-30 NOTE — ED PROVIDER NOTES
Patient:   Sampson Ly            MRN: [id number]            FIN: [id number]               Age:   27 years     Sex:  Female     :  1990   Associated Diagnoses:   Acute asthma exacerbation   Author:   Eduard AKERS MD, Adam GROSSMAN      Basic Information   Time seen: Date & time 2018 23:33:00.   History source: Patient, EMS.   Arrival mode: Ambulance.   History limitation: Clinical condition.   Additional information: Chief Complaint from Nursing Triage Note : Chief Complaint   2018 23:28 CDT      Chief Complaint           pt to ed with asthma exacerbation. duonebin progress. initial sat was 91%. given 0.3 epi im pta. pt has increased wob.  .      History of Present Illness   The patient presents with 28 yo female with Hx of asthma presents with SOB via EMS. Along with SOB, Pt admits to chest pain and HA. She denies fever, N/V, neck or back pain, or current pregnancy. Pt denies smoking. She received epinephrine 0.3 mg and solumedrol en route via EMS. History limited by clinical condition..  The onset was just prior to arrival.  The course/duration of symptoms is unknown.  Risk factors consist of asthma, obesity, not smoking and not pregnancy.  Prior episodes: frequent.  Therapy today: beta-agonist nebulizer, emergency medical services and epinephrine.  Associated symptoms: chest pain, headache, denies neck pain, denies current pregnancy, denies fever, denies nausea, denies vomiting and denies back pain.        Review of Systems             Additional review of systems information: Unable to obtain due to: Clinical condition.      Health Status   Allergies:    Allergic Reactions (All)  Severity Not Documented  Egg- Unknown.  Iodine- Sob.  Mobic- Hives.  Peanuts- No reactions were documented.  Seafood- Sob.  TraZODone- Tachycardia.  Canceled/Inactive Reactions (All)  No Known Allergies  TraMADol- Swelling of throat..   Medications:  (Selected)   Inpatient Medications  Ordered  IVF Normal Saline  NS Infusion 1,000 mL: 1,000 mL, 1,000 mL, IV, 500 mL/hr, start date 04/29/18 23:35:00 CDT  Magnesium Sulfate 2gm/50ml IVPB (Premix): 2 gm, form: Infusion, IV Piggyback, Once, Infuse over: 1 hr, first dose 04/29/18 23:35:00 CDT, stop date 04/29/18 23:35:00 CDT, STAT  Solumedrol IV push / IM: 125 mg, form: Injection, IV Push, [id number], first dose 04/29/18 23:35:00 CDT  Toradol 30 mg for IV Push: 30 mg, form: Injection, IV Push, Once, first dose 03/31/16 19:03:00 CDT, stop date 03/31/16 19:03:00 CDT, STAT, 24  albuterol 2.5 mg/3 mL (0.083%) inhalation solution: 10 mg, form: Soln-Inh, NEB, Once, first dose 04/29/18 23:35:00 CDT, stop date 04/29/18 23:35:00 CDT, STAT, Continuous Inhalation Therapy  Prescriptions  Prescribed  amitriptyline 25 mg oral tablet: 25 mg = 1 tab(s), Oral, Once a day (at bedtime), To help prevent headaches., # 30 tab(s), 0 Refill(s)  Documented Medications  Documented  VENTOLIN HFA AER: .   Immunizations: Per nurse's notes.   Menstrual history: Per nurse's notes.   Pregnancy history: not currently pregnant.      Past Medical/ Family/ Social History   Medical history:    Resolved  Asthma ([id number]):  Resolved..   Surgical history: Negative.   Family history: Not significant.   Social history: Tobacco use: Denies.      Physical Examination               Vital Signs   Vital Signs   4/29/2018 23:28 CDT      Temperature Oral          37 DegC                             Temperature Oral (calculated)             98.60 DegF                             Peripheral Pulse Rate     132 bpm  HI                             Respiratory Rate          29 br/min  HI                             SpO2                      100 %                             Oxygen Therapy            Aerosol mask                             Systolic Blood Pressure   128 mmHg                             Diastolic Blood Pressure  81 mmHg  .   Measurements   4/29/2018 23:28 CDT      Weight Estimated          91.5 kg                              Height/Length Estimated   165 cm                             Body Mass Index Estimated 33.61 kg/m2  .   Basic Oxygen Information   4/29/2018 23:28 CDT      SpO2                      100 %                             Oxygen Therapy            Aerosol mask  .   General:  Alert, severe distress, anxious, Skin: diaphoretic.    Skin:  Warm, pink, intact, moist, no rash   Head:  Normocephalic, atraumatic.    Cardiovascular:  No murmur, Normal peripheral perfusion, No edema, Tachycardia.    Respiratory:  Breath sounds are equal, Symmetrical chest wall expansion, Respirations: Tachypneic, respiratory distress severe, labored, Breath sounds: Bilateral, wheezes present (severe, inspiratory wheezes, expiratory wheezes), severely diminished in all lung fields.    Gastrointestinal:  Soft, Nontender, Non distended, Normal bowel sounds   Musculoskeletal:  Normal ROM, normal strength   Neurological:  Alert and oriented to person, place, time, and situation, No focal neurological deficit observed, CN II-XII intact, normal sensory observed, normal motor observed, normal speech observed   Lymphatics:  No lymphadenopathy.   Psychiatric:  Cooperative, appropriate mood & affect, normal judgment.       Medical Decision Making   Documents reviewed:  Emergency department nurses' notes.   Cardiac monitor:  Normal sinus rhythm, Sinus Tachycardia.    Electrocardiogram:  Normal sinus rhythm.   Results review:  Lab results : Lab View   4/30/2018 0:50 CDT       Sample ABG                ART                             Treatment                 NONREBREATHER                             Site                      Radial Rt                             pH Art                    7.370                             pCO2 Art                  41.0 mmHg                             pO2 Art                   155.0 mmHg  HI                             HCO3 Art                  23.7 mmol/L                             CO2 Totl Art              25.0 mmol/L                              O2 Sat Art                99.3 %  HI                             D base                    -1.5                             THB ABG                   10.0 gm/dL  LOW                             CO Hgb                    1.0 %  NA                             Met Hgb Art               1.0 %                             O2 Hgb                    96.7 %                             CaO2                      13.9 mL/dL  LOW                             Ionized Calcium           1.14 mmol/L                             Sodium RT                 139.0 mmol/L                             Potassium RT              2.80 mmol/L  LOW                             Allens                    N/A                             Setting 1 ABG             NONREBREATHER                             Setting 2 ABG             12L    4/30/2018 0:10 CDT       POC BNP iSTAT             21 pg/mL                             POC Troponin              0.00 ng/mL    4/29/2018 23:43 CDT      Sodium Lvl                142 mmol/L                             Potassium Lvl             3.7 mmol/L                             Chloride                  111 mmol/L  HI                             CO2                       26.0 mmol/L                             Calcium Lvl               8.6 mg/dL                             Glucose Lvl               108 mg/dL  HI                             BUN                       9.0 mg/dL                             Creatinine                0.75 mg/dL                             eGFR-AA                   >60 mL/min/1.73 m2  NA                             eGFR-PAL                  >60 mL/min/1.73 m2  NA                             Bili Total                0.3 mg/dL                             Bili Direct               0.10 mg/dL                             Bili Indirect             0.20 mg/dL                             AST                       13 unit/L  LOW                             ALT                        20 unit/L                             Alk Phos                  48 unit/L                             Total Protein             7.2 gm/dL                             Albumin Lvl               3.80 gm/dL                             Globulin                  3.40 gm/dL                             A/G Ratio                 1.1  NA                             WBC                       7.1 x10(3)/mcL                             RBC                       4.64 x10(6)/mcL                             Hgb                       10.6 gm/dL  LOW                             Hct                       36.7 %  LOW                             Platelet                  281 x10(3)/mcL                             MCV                       79.1 fL  LOW                             MCH                       22.8 pg  LOW                             MCHC                      28.9 gm/dL  LOW                             RDW                       17.5 %  HI                             MPV                       9.9 fL                             Abs Neut                  3.51 x10(3)/mcL                             Neutro Auto               49 %  NA                             Lymph Auto                34 %  NA                             Mono Auto                 9 %  NA                             Eos Auto                  7 %  NA                             Abs Eos                   0.5 x10(3)/mcL                             Basophil Auto             0 %  NA                             Abs Neutro                3.51 x10(3)/mcL                             Abs Lymph                 2.4 x10(3)/mcL                             Abs Mono                  0.6 x10(3)/mcL                             Abs Baso                  0.0 x10(3)/mcL  .   Chest X-Ray:  No acute disease process, View: AP, interpretation by Emergency Physician.       Reexamination/ Reevaluation   Time: 4/30/2018 01:23:00 .   Vital signs   Basic Oxygen Information    4/29/2018 23:28 CDT      SpO2                      100 %                             Oxygen Therapy            Aerosol mask     Interventions: Patient was given epinephrine nebulizers and steroids prior to arrival patient was given an hour-long neb magnesium repeat order of steroids and patient was given after 2 hour antonia another epinephrine which seemed to have significant moderation of symptoms patient still with diffuse wheezing and will talk her cell phone now but still requiring admission.      Procedure   Critical care note   Total time: 45 minutes spent engaged in work directly related to patient care and/ or available for direct patient care.   Critical condition(s) addressed for impending deterioration include: respiratory.   Associated risk factors: hypoxia.   Management: bedside assessment, Interpretation (blood gases, electrocardiogram, blood pressure), Interventions hemodynamic management, Case review primary care physician.      Impression and Plan   Diagnosis   Acute asthma exacerbation (GJE28-LT J45.901)      Calls-Consults   -  collins-admit.   Plan   Condition: Improved, Stable.    Disposition: Admit time  4/30/2018 01:25:00.    Counseled: Patient, Family, Regarding diagnosis, Regarding diagnostic results, Regarding treatment plan, Regarding prescription, Patient indicated understanding of instructions.    Notes: I, Gabe Blum, acted solely as a scribe for and in the presence of Dr. Chapa who performed the service., This scribes note accurately reflects the work done by me I have reviewed the note and personally performed a history and physical and agree with all the documentation and findings,       This scribes note accurately reflects the work done by me I have reviewed the note and personally performed a history and physical and agree with all the documentation and findings.

## 2022-04-30 NOTE — DISCHARGE SUMMARY
Patient:   Sampson Ly            MRN: [id number]            FIN: [id number]               Age:   27 years     Sex:  Female     :  1990   Associated Diagnoses:   Acute asthma exacerbation; Asthmatic breathing   Author:   Genaro DIAZ, Aixa Gutiérrez      Discharge Information   Discharge Summary Information:  Admitted  2018, Discharged  2018, Admitting diagnosis (Asthma exacerbation).         Discharge diagnosis: Acute asthma exacerbation (HCW46-FF J45.901), Asthmatic breathing (PNED [id number]).       Physical Examination      Vital Signs (last 24 hrs)_____  Last Charted___________  Temp Oral     36.6 DegC  (MAY 02 07:)  Heart Rate Peripheral   69 bpm  (MAY 02 08:)  Resp Rate         20 br/min  (MAY 02 08:)  SBP      119 mmHg  (MAY 02 07:)  DBP      69 mmHg  (MAY 02 07:)  SpO2      98 %  (MAY 02 08:)     General:  Alert and oriented, no acute distress  Neck:  Supple, No carotid bruit, No jugular venous distention.    Respiratory:  Respirations are non-labored, clear to auscultation bilaterally, no wheeze, rhonchi or crackles  Cardiovascular:  Normal rate, Regular rhythm, No murmur, Good pulses equal in all extremities  Gastrointestinal:  Soft, Non-tender, Non-distended, Normal bowel sounds.    Integumentary:  Warm, Dry, Intact.    Musculoskeletal:  Normal strength, No tenderness, No swelling.    Neurologic:  Alert, Oriented, Normal sensory, No focal deficits.               Hospital Course   Mrs. Ly is a 28 yo AAF with pmhx of asthma and seasonal allergies presents to the ED with c/o acute onset of dyspnea with associated wheezing around 11 PM tonight.  Patient reports using her home nebulizer with no relief so she called EMS.  She denies fever, chills, chest pain, cough sick contacts or recent hospitalizations.When EMS arrived patient sats were 91%.  She was given 0.3 of epinephrine along with steroids en route.   Initial ED vital signs:Temperature  98.6, heart rate 132, respirations 29, oxygenation 100% on aerosol mask.  She was given an hour-long nebulizer treatment, 1 mg of epinephrine, magnesium sulfate and Solu-Medrol with some improvement in her oxygenation and work of breathing.  ABGs unremarkable except a PO2 of 155.  Chest x-ray negative for acute cardiopulmonary process.  Patient is admitted to the hospitalist service for treatment of her asthma exacerbation.  Patient showed significant improvement with Solu-Medrol and duo nebs.  She will be discharged home on a Medrol Dosepak.  Her respiratory status is completely back to normal.  Patient requesting Norco for headaches, advised to follow-up with her primary care physician.  Overall her other medical comorbidities have remained stable.  Time spent: 35 minutes         Discharge Plan   Discharge Summary Plan   Discharge Status: improved.     Discharge instructions given: to patient.     Discharge disposition: discharge to home (into the care of family member, self care).     Prescriptions: continue same medications, reviewed with patient, written and given to patient, per med rec sheet.     Orders     Orders   Admit/Transfer/Discharge:  Discharge (Order): Home  Discharge Activity (Order): Activity as Tolerated.     Education and Follow-up   Counseled: patient, regarding diagnosis, regarding treatment, regarding medications.     Discharge Planning: Migraine Headache, Asthma, Adult, Follow up with primary care provider.     Acute Asthma Exacerbation  - Duonebs Q4H  -Was maintained on IV Solumedrol 60mg , will be discharged home on a Medrol Dosepak, prescription will be called into the pharmacy.  - 02 therapy to keep sats >92%    Obesity  - counseled on diet and exercise    Headache  -Patient's home medication amitriptyline will be continued, she is advised to follow-up for her migraines with the primary care physician.  Patient has a drug-seeking behavior and is requesting Norco however this was denied on  discharge.-

## 2022-04-30 NOTE — ED PROVIDER NOTES
Patient:   Sampson Ly            MRN: [id number]            FIN: [id number]               Age:   27 years     Sex:  Female     :  1990   Associated Diagnoses:   Acute UTI; Allergy, drug   Author:   Katie Vaughan MD      Basic Information   Additional information: Chief Complaint from Nursing Triage Note : Chief Complaint   2017 19:52 CDT      Chief Complaint           r flank pain / abdominal pain x 1 week / denies n/v/d  .      History of Present Illness   The patient presents with abdominal pain.  The onset was 2  weeks ago.  The course/duration of symptoms is fluctuating in intensity.  The character of symptoms is crampy.  The degree at onset was moderate.  The Location of pain at onset was right, lower and abdominal.  The degree at present is moderate.  The Location of pain at present is right.  Radiating pain: Right anterior thigh. The relieving factor is none.  Therapy today: Toradol.  Risk factors consist of Asthma.  Associated symptoms: itchy rash, wheezing, cough.  Additional history: Ms Ly is a 27-year-old black female who states she has right lower quadrant pain radiating into her right thigh present for the last 2 weeks.  She went to estimate the LA last week and was prescribed Toradol but today woke up with urticarial lesions to her arms which are very itchy.  She also complains of wheezing shortness of breath and cough stating that she is an asthmatic, has been smoking cigarettes, and does not understand why she is still wheezing.  She is to her breathing treatment twice today.  She has no fever and has never been intubated for her asthma..        Review of Systems   Skin symptoms:  Rash.   Respiratory symptoms:  Cough.   Gastrointestinal symptoms:  Abdominal pain, right lower quadrant.              Additional review of systems information: All other systems reviewed and otherwise negative.      Health Status   Allergies:    Allergic Reactions (Selected)  Severity Not  Documented  Iodine- Sob.  Mobic- Hives.  Peanuts- No reactions were documented.  Seafood- Sob.  TraMADol- Swelling of throat.,    Allergies (5) Active Reaction  iodine sob  Mobic Hives  Peanuts None Documented  Seafood sob  traMADol swelling of throat  .   Medications:  (Selected)   Inpatient Medications  Ordered  Toradol 30 mg for IV Push: 30 mg, form: Injection, IV Push, Once, first dose 03/31/16 19:03:00 CDT, stop date 03/31/16 19:03:00 CDT, STAT, 24  Prescriptions  Prescribed  albuterol 2.5 mg/3 mL (0.083%) inhalation solution: 2.5 mg = 3 mL, INH, q6hr, # 30 EA, 0 Refill(s)  albuterol CFC free 90 mcg/inh inhalation aerosol with adapter: 2 puff(s), INH, QID, PRN PRN cough or wheezing, # 1 EA, 0 Refill(s)  Documented Medications  Documented  Fioricet oral capsule: 1 cap(s), Oral, q4hr, PRN PRN as needed, # 30 cap(s), 0 Refill(s).      Past Medical/ Family/ Social History   Medical history:    Active  Asthma ([id number]), Reviewed as documented in chart.   Surgical history: Negative.   Family history: Not significant.   Social history: Tobacco use: Regularly.   Problem list:    Active Problems (5)  ACUTE PYELONEPHRITIS   Asthma   Knowledge deficit   Pain, flank, bilateral   Tobacco user   , per nurse's notes.      Physical Examination               Vital Signs   Vital Signs   8/17/2017 19:52 CDT      Temperature Oral          36.8 DegC                             Peripheral Pulse Rate     85 bpm                             Respiratory Rate          18 br/min                             SpO2                      99 %                             Oxygen Therapy            Room air                             Systolic Blood Pressure   121 mmHg                             Diastolic Blood Pressure  76 mmHg  .      Vital Signs (last 24 hrs)_____  Last Charted___________  Temp Oral     36.8 DegC  (AUG 17 19:52)  Heart Rate Peripheral   90 bpm  (AUG 17 22:12)  Resp Rate         20 br/min  (AUG 17 22:12)  SBP      121 mmHg   (AUG 17 19:52)  DBP      76 mmHg  (AUG 17 19:52)  SpO2      99 %  (AUG 17 22:12)  .   Basic Oxygen Information   8/17/2017 19:52 CDT      SpO2                      99 %                             Oxygen Therapy            Room air  .   General:  Alert, no acute distress.    Skin:  Warm, dry, pink, intact.    Ears, nose, mouth and throat:  Oral mucosa moist.   Neck:  Supple.   Cardiovascular:  Regular rate and rhythm.   Respiratory:  Lungs are clear to auscultation.   Gastrointestinal:  Soft, Non distended, Tenderness: Negative, Guarding: Negative, Rebound: Negative, Mass: Negative.    Back:  no CVA tenderness.   Musculoskeletal:  No tenderness, no swelling, ambulatory without assistance.    Neurological:  Alert and oriented to person, place, time, and situation.   Psychiatric:  Cooperative.      Medical Decision Making   Documents reviewed:  Emergency department nurses' notes.   Orders  [facility] Orders   Laboratory:  UPT - Urine Pregancy Test (Order): Stat collect, Urine, 8/17/2017 20:43 CDT, Nurse collect, Print Label By Order Location, 8/17/2017 20:43 CDT  UA with Microscopic if Indicated (Order): Stat collect, Urine, 8/17/2017 20:43 CDT, Nurse collect, Print Label By Order Location, 8/17/2017 20:43 CDT  Pharmacy:  DuoNeb 0.5 mg-2.5 mg/3 mL inhalation solution (Order): 3 mL, form: Soln, NEB, Now, first dose 8/17/2017 20:43 CDT, stop date 8/17/2017 20:43 CDT  Benadryl ORAL (Order): 25 mg, form: Cap, Oral, Once-NOW, first dose 8/17/2017 20:43 CDT, stop date 8/17/2017 20:43 CDT, [phone]  Celestone Soluspan (Order): 12 mg, form: Injection, IM, Once-NOW, first dose 8/17/2017 20:42 CDT, stop date 8/17/2017 20:42 CDT, [phone]  Radiology:  XR Chest 2 Views (Order): Stat, 8/17/2017 20:43 CDT, Chest Pain, None, Stretcher, Rad Type.   Pregnancy test:  UCG-negative.   Results review:  Lab results : Lab View   8/17/2017 20:55 CDT      U Beta hCG Ql             Negative                             UA Appear                  Slightly Cloudy                             UA Color                  Lida                             UA Spec Grav              1.015                             UA Bili                   Negative                             UA pH                     6.0                             UA Urobilinogen           3+                             UA Blood                  Negative                             UA Glucose                Negative                             UA Ketones                Negative                             UA Protein                Trace                             UA Nitrite                Negative                             UA Leuk Est               2+                             UA WBC                    20-50 /HPF                             UA RBC                    0-1 /HPF                             UA Mucous                 Moderate /LPF                             UA Bacteria               Moderate /HPF                             UA Squam Epithelial       Moderate /LPF  .   Chest X-Ray:  No acute disease process.      Reexamination/ Reevaluation   Time: 8/17/2017 21:59:00 .   Notes: Lungs are clear and rash is fading.  She should never take Toradol again due to allergic reaction..      Impression and Plan   Diagnosis   Acute UTI (GLL12-OX N39.0)   Allergy, drug (XNL59-JV Z88.9)   Plan   Condition: Stable.    Disposition: Discharged: Time  8/17/2017 21:59:00, to home.    Prescriptions: Launch prescriptions   Pharmacy:  Cipro 500 mg oral tablet (Prescribe): 500 mg = 1 tab(s), Oral, q12hr, X 10 day(s), # 20 tab(s), 0 Refill(s), [facility] Meds List   Medications reviewed..    Patient was given the following educational materials: Urinary Tract Infection, Drug Allergy.    Follow up with: Follow up with your doctor next week..    Counseled: Patient, Regarding diagnosis, Regarding diagnostic results, Regarding treatment plan, Regarding prescription, Patient indicated understanding of  instructions.

## 2022-04-30 NOTE — ED PROVIDER NOTES
Patient:   Sampson Ly            MRN: [id number]            FIN: [id number]               Age:   27 years     Sex:  Female     :  1990   Associated Diagnoses:   Suprapubic pain   Author:   Nakul Freeman MD      Basic Information   Time seen: Date & time 2017 10:31:00.   History source: Patient.   Arrival mode: Private vehicle.   History limitation: None.   Additional information: Chief Complaint from Nursing Triage Note : Chief Complaint   2017 9:54 CDT        Chief Complaint           abd pain x 2 weeks , headache , nausea , no vomiting , lmp  says irregular periods  .      History of Present Illness   The patient presents with abdominal pain.  The onset was 2  weeks ago.  The course/duration of symptoms is constant.  The character of symptoms is dull.  The degree at onset was minimal.  The Location of pain at onset was suprapubic.  The degree at present is minimal.  The Location of pain at present is suprapubic.  Radiating pain: none. The exacerbating factor is none.  The relieving factor is none.  Therapy today: none.  Risk factors consist of none.  Associated symptoms: nausea.  pt here with daughter.c/o h/a, nausea, lower abdominal pain for 2 weeks..        Review of Systems             Additional review of systems information: All other systems reviewed and otherwise negative.      Health Status   Allergies:    Allergic Reactions (Selected)  Severity Not Documented  Iodine- Sob.  Mobic- Hives.  Peanuts- No reactions were documented.  Seafood- Sob.  TraMADol- Swelling of throat.,    Allergies (5) Active Reaction  iodine sob  Mobic Hives  Peanuts None Documented  Seafood sob  traMADol swelling of throat  .   Medications:  (Selected)   Inpatient Medications  Ordered  Toradol 30 mg for IV Push: 30 mg, form: Injection, IV Push, Once, first dose 16 19:03:00 CDT, stop date 16 19:03:00 CDT, STAT, 24  Prescriptions  Prescribed  albuterol 2.5 mg/3 mL (0.083%) inhalation  solution: 2.5 mg = 3 mL, INH, q6hr, # 30 EA, 0 Refill(s)  albuterol CFC free 90 mcg/inh inhalation aerosol with adapter: 2 puff(s), INH, QID, PRN PRN cough or wheezing, # 1 EA, 0 Refill(s).      Past Medical/ Family/ Social History   Medical history:    Active  Asthma ([id number]), Reviewed as documented in chart, chronic pain.   Surgical history:    No active procedure history items have been selected or recorded., Reviewed as documented in chart.   Family history:    No family history items have been selected or recorded., Reviewed as documented in chart.   Social history:    Social & Psychosocial Habits    Alcohol  07/12/2012 Risk Assessment: Denies Alcohol Use    05/08/2017  Use: Past    Type: Beer    Frequency: 1-2 times per year    Substance Abuse  07/12/2012 Risk Assessment: Denies Substance Abuse    06/20/2016  Use: Never    Tobacco  07/12/2012 Risk Assessment: Denies Tobacco Use    07/27/2017  Use: Former smoker    Stopped at age: 26 Years  , Reviewed as documented in chart, Tobacco use: Denies.   Problem list:    Active Problems (5)  ACUTE PYELONEPHRITIS   Asthma   Knowledge deficit   Pain, flank, bilateral   Tobacco user   .      Physical Examination               Vital Signs   Vital Signs   9/1/2017 9:54 CDT        Temperature Oral          37.0 DegC                             Peripheral Pulse Rate     84 bpm                             Respiratory Rate          18 br/min                             SpO2                      97 %                             Oxygen Therapy            Room air                             Systolic Blood Pressure   129 mmHg                             Diastolic Blood Pressure  79 mmHg  .      Vital Signs (last 24 hrs)_____  Last Charted___________  Temp Oral     37.0 DegC  (SEP 01 09:54)  Heart Rate Peripheral   84 bpm  (SEP 01 09:54)  Resp Rate         18 br/min  (SEP 01 09:54)  SBP      129 mmHg  (SEP 01 09:54)  DBP      79 mmHg  (SEP 01 09:54)  SpO2      97 %  (SEP 01  09:54)  .   Measurements   9/1/2017 9:54 CDT        Weight Dosing             95 kg                             Weight Measured and Calculated in Lbs     209.44 lb                             Weight Estimated          95 kg                             Height/Length Dosing      165 cm                             Height/Length Estimated   165 cm                             Body Mass Index Estimated 34.89 kg/m2  .   Basic Oxygen Information   9/1/2017 9:54 CDT        SpO2                      97 %                             Oxygen Therapy            Room air  .   General:  Alert, no acute distress.    Skin:  Warm.   Head:  Normocephalic.   Neck:  Supple.   Eye:  Pupils are equal, round and reactive to light, extraocular movements are intact.    Ears, nose, mouth and throat:  Oral mucosa moist.   Cardiovascular:  Regular rate and rhythm.   Respiratory:  Lungs are clear to auscultation.   Chest wall:  No tenderness.   Back:  Normal range of motion.   Musculoskeletal:  Normal ROM.   Gastrointestinal:  Soft, Non distended, Tenderness: Mild, suprapubic, Guarding: Negative, Rebound: Negative, Bowel sounds: Normal.    Neurological:  Alert and oriented to person, place, time, and situation, No focal neurological deficit observed, CN II-XII intact, normal sensory observed, normal motor observed, normal speech observed.    Psychiatric:  Cooperative.      Medical Decision Making   Documents reviewed:  Emergency department nurses' notes.   Orders  Launch Order Profile (Selected)   Inpatient Orders  Ordered  Discharge Planning Ongoing Assessment: 09/04/17 9:00:00 CDT, q3day  Ordered (In-Lab)  UA with Microscopic if Indicated: Stat collect, Urine, 09/01/17 10:29:00 CDT, Stop date 09/01/17 10:29:00 CDT, Nurse collect, Print Label By Order Location, 09/01/17 10:29:00 CDT  UPT - Urine Pregancy Test: Stat collect, Urine, 09/01/17 10:29:00 CDT, Stop date 09/01/17 10:29:00 CDT, Nurse collect, Print Label By Order Location, 09/01/17  10:29:00 CDT.   Results review:     No qualifying data available.      Reexamination/ Reevaluation   Vital signs   Basic Oxygen Information   9/1/2017 9:54 CDT        SpO2                      97 %                             Oxygen Therapy            Room air        Impression and Plan   Diagnosis   Suprapubic pain ([id number] R10.2)   Plan   Condition: Stable.    Disposition: Eloped, left prior to test results.

## 2022-05-01 NOTE — HISTORICAL [facility] CERNER
This is a historical note converted from [employer]. Formatting and pictures may have been removed.  Please reference [employer] for original formatting and attached multimedia. Chief Complaint  Hasnt had a cycle since Nov. / Complains of lower Rt side abd pain  History of Present Illness  Pt is a 30-year-old female, here today for?no cycle since November. ?Also complaining of right abdominal pain that started yesterday. ?Patient was seen in ED on 1/18/2021?for?amenorrhea since November.? UPT and?hCG were done and were both negative. Pt states she is due to receive dupixent for eczema this week and her doctor will not accept the lab results she has given to him. + abd pain in RLQ. Denies dysuria, fever, body aches, vaginal discharge.  Review of Systems  Constitutional: negative except as stated in HPI  Eye: negative except as stated in HPI  ENT: negative except as stated in HPI  Respiratory: negative except as stated in HPI  Cardiovascular: negative except as stated in HPI  Gastrointestinal: negative except as stated in HPI  Genitourinary: negative except as stated in HPI  Hema/Lymph: negative except as stated in HPI  Endocrine: negative except as stated in HPI  Immunologic: negative except as stated in HPI  Musculoskeletal: negative except as stated in HPI  Integumentary: negative except as stated in HPI  Neurologic: negative except as stated in HPI  All Other ROS_ negative except as stated in HPI  ?  ?  Physical Exam  Vitals & Measurements  T:?36.9? ?C (Oral)? HR:?80(Peripheral)? RR:?18? BP:?123/88? SpO2:?100%?  HT:?166.00?cm? WT:?109.200?kg? BMI:?39.63?  General: Alert and oriented, No acute distress.  HENT: Normocephalic  Respiratory: Lungs are clear to auscultation, Respirations are non-labored, Breath sounds are equal, Symmetrical chest wall expansion.  Cardiovascular: Normal rate, Regular rhythm, No murmur.  Gastrointestinal: Soft, Non-tender, Non-distended, Normal bowel sounds.  Genitourinary: No CVA  tenderness  Neurologic: No focal deficits, Cranial Nerves II-XII are grossly intact.  ?  Assessment/Plan  1.?Acute UTI?N39.0  ?Medication as ordered. Maintain adequate hydration. Wipe front to back.? Urine sent for C&S. ?ER precautions  Ordered:  nitrofurantoin, 100 mg = 1 cap(s), Oral, BID, X 7 day(s), # 14 cap(s), 0 Refill(s), Pharmacy: [id number], 166, cm, Height/Length Dosing, 01/19/21 10:24:00 CST, 109.2, kg, Weight Dosing, 01/19/21 10:24:00 CST  Office/Outpatient Visit Level 3 Established [id number] PC, Acute UTI  Missed period, 01/19/21 11:07:00 CST  Urine Culture [id number], Stat collect, 01/19/21 11:01:00 CST, Urine, Nurse collect, Stop date 01/19/21 11:02:00 CST, Acute UTI  ?  2.?Missed period?N92.6  ?UPT neg. Will refer to gyn.  Ordered:  Office/Outpatient Visit Level 3 Established [id number] PC, Acute UTI  Missed period, 01/19/21 11:07:00 CST  ?  Referrals  [facility] Internal Referral to Gynecology Clinic, Specialty: Gynecology, Reason: no period since November, neg UPT, neg hcg, Start: 01/19/21 11:04:00 CST   Problem List/Past Medical History  Ongoing  Acute disease or injury-related malnutrition  ACUTE PYELONEPHRITIS  Asthma  Eczema  Eczema  Obesity  Pain, flank, bilateral  Seasonal allergy  Tobacco user  Historical  Asthma  Procedure/Surgical History  Application of finger splint; static (12/17/2019)  Immobilization of Right Finger using Splint (12/17/2019)  none   Medications  albuterol CFC free 90 mcg/inh inhalation aerosol with adapter, 2 puff(s), INH, QID  baclofen 10 mg oral tablet, 10 mg= 1 tab(s), Oral, TID, PRN  Dupixent 300 mg/2 mL subcutaneous solution, 300 mg, Subcutaneous  fluticasone 50 mcg/inh nasal spray, 1 spray(s), Nasal, Daily  hydrocortisone topical 2.5% cream, 1 thong, TOP, TID  hydrOXYzine hydrochloride 25 mg oral tablet, 25 mg= 1 tab(s), Oral, QID  levocetirizine 5 mg oral tablet, 5 mg= 1 tab(s), Oral, qPM  loratadine 10 mg oral tablet, 10 mg= 1 tab(s), Oral, Daily  Macrobid 100 mg oral  capsule, 100 mg= 1 cap(s), Oral, BID  Symbicort 80 mcg-4.5 mcg/inh inhalation aerosol, 2 puff(s), INH, BID  Tylenol, 1000 mg= 2 tab(s), Oral, Once  Allergies  Ultram?(anaphylaxis)  Mobic?(Hives)  Peanuts  Robaxin?(anaphylaxis/throat closes)  Seafood?(sob)  Toradol?(ithcing)  iodine?(sob)  traZODone?(tachycardia)  Social History  Abuse/Neglect  No, No, Yes, 01/19/2021  Alcohol - Denies Alcohol Use, 07/12/2012  Current, Beer, 1-2 times per month, 11/24/2018  Past, Beer, 1-2 times per year, 05/08/2017  Substance Use - Denies Substance Abuse, 07/12/2012  Never, 06/20/2016  Tobacco - Denies Tobacco Use, 07/12/2012  4 or less cigarettes(less than 1/4 pack)/day in last 30 days, No, 08/25/2020  Health Maintenance  Health Maintenance  ???Pending?(in the next year)  ??? ??OverDue  ??? ? ? ?Asthma Management-[place] due??11/01/18??and every 6??month(s)  ??? ? ? ?Influenza Vaccine due??10/01/20??and every 1??day(s)  ??? ? ? ?Smoking Cessation due??01/01/21??and every 1??year(s)  ??? ??Due?  ??? ? ? ?Alcohol Misuse Screening due??01/02/21??and every 1??year(s)  ??? ? ? ?Asthma Management-Asthma Education due??01/19/21??and every 6??month(s)  ??? ? ? ?Asthma Management-Spirometry due??01/19/21??Variable frequency  ??? ? ? ?Asthma Management-Written Action Plan due??01/19/21??and every 6??month(s)  ??? ? ? ?Tetanus Vaccine due??01/19/21??and every 10??year(s)  ??? ??Due In Future?  ??? ? ? ?Obesity Screening not due until??01/01/22??and every 1??year(s)  ???Satisfied?(in the past 1 year)  ??? ??Satisfied?  ??? ? ? ?ADL Screening on??01/19/21.??Satisfied by [facility] LPN, [place]  ??? ? ? ?Blood Pressure Screening on??01/19/21.??Satisfied by [facility] LPN [place]  ??? ? ? ?Body Mass Index Check on??01/19/21.??Satisfied by [facility] LPN, [place]  ??? ? ? ?Depression Screening on??01/19/21.??Satisfied by [facility] LPN [place]  ??? ? ? ?Influenza Vaccine on??01/19/21.??Satisfied by [facility] LPN, [place]  ??? ? ? ?Obesity  Screening on??01/19/21.??Satisfied by [facility] LPN, [place]  ?  Diagnostic Results  Pregnancy Testing????????????????????  Beta hCG Ql????????????Negative????????  Urinalysis????????????????????  Color Ur Dipstick????Yellow????????????????  Appearance Ur Dipstick????Clear????????????????  pH Ur Dipstick????6????????????????  Specific Gravity Ur Dipstick????1.020????????????????  Blood Ur Dipstick????Trace????????????????  Glucose Ur Dipstick????Negative????????????????  Ketones Ur Dipstick????Negative????????????????  Protein Ur Dipstick????Negative????????????????  Bilirubin Ur Dipstick????Negative????????????????  Urobilinogen Ur Dipstick????0.2 mg/dl????????????????  Leukocytes Ur Dipstick????Small????????????????  Nitrite Ur Dipstick????Negative????????????????  Point of Care Testing????????????????????  U beta hCG Ql POC????????????????Negative????  ?

## 2022-05-03 DIAGNOSIS — J30.9 RHINITIS, ALLERGIC: Primary | ICD-10-CM

## 2022-06-01 ENCOUNTER — HOSPITAL ENCOUNTER (OUTPATIENT)
Dept: RADIOLOGY | Facility: HOSPITAL | Age: 32
Discharge: HOME | End: 2022-06-01
Attending: OTOLARYNGOLOGY
Payer: MEDICAID

## 2022-06-01 DIAGNOSIS — J30.9 RHINITIS, ALLERGIC: ICD-10-CM

## 2022-06-01 PROCEDURE — 70486 CT MAXILLOFACIAL W/O DYE: CPT | Mod: TC

## 2022-06-08 ENCOUNTER — OFFICE VISIT (OUTPATIENT)
Dept: OTOLARYNGOLOGY | Facility: CLINIC | Age: 32
End: 2022-06-08
Payer: MEDICAID

## 2022-06-08 ENCOUNTER — ANESTHESIA EVENT (OUTPATIENT)
Dept: SURGERY | Facility: HOSPITAL | Age: 32
End: 2022-06-08
Payer: MEDICAID

## 2022-06-08 VITALS
HEART RATE: 76 BPM | SYSTOLIC BLOOD PRESSURE: 117 MMHG | BODY MASS INDEX: 40.33 KG/M2 | TEMPERATURE: 97 F | WEIGHT: 245 LBS | DIASTOLIC BLOOD PRESSURE: 78 MMHG

## 2022-06-08 DIAGNOSIS — J32.9 CHRONIC RHINOSINUSITIS: ICD-10-CM

## 2022-06-08 DIAGNOSIS — Z01.818 OTHER SPECIFIED PRE-OPERATIVE EXAMINATION: Primary | ICD-10-CM

## 2022-06-08 DIAGNOSIS — J34.89 NASAL OBSTRUCTION: ICD-10-CM

## 2022-06-08 DIAGNOSIS — R43.0 ANOSMIA: ICD-10-CM

## 2022-06-08 DIAGNOSIS — J34.89 CONCHA BULLOSA: ICD-10-CM

## 2022-06-08 DIAGNOSIS — J33.9 NASAL POLYPOSIS: Primary | ICD-10-CM

## 2022-06-08 DIAGNOSIS — J45.909 ASTHMA, UNSPECIFIED ASTHMA SEVERITY, UNSPECIFIED WHETHER COMPLICATED, UNSPECIFIED WHETHER PERSISTENT: ICD-10-CM

## 2022-06-08 PROCEDURE — 99213 OFFICE O/P EST LOW 20 MIN: CPT | Mod: PBBFAC

## 2022-06-08 RX ORDER — ALBUTEROL SULFATE 0.83 MG/ML
SOLUTION RESPIRATORY (INHALATION)
COMMUNITY
Start: 2022-03-24

## 2022-06-08 RX ORDER — BUDESONIDE AND FORMOTEROL FUMARATE DIHYDRATE 160; 4.5 UG/1; UG/1
2 AEROSOL RESPIRATORY (INHALATION) EVERY 12 HOURS
COMMUNITY

## 2022-06-08 RX ORDER — IBUPROFEN 200 MG
TABLET ORAL
COMMUNITY
Start: 2022-03-29 | End: 2022-06-08

## 2022-06-08 RX ORDER — LIDOCAINE HYDROCHLORIDE 10 MG/ML
1 INJECTION, SOLUTION EPIDURAL; INFILTRATION; INTRACAUDAL; PERINEURAL ONCE
Status: CANCELLED | OUTPATIENT
Start: 2022-06-08 | End: 2022-06-08

## 2022-06-08 RX ORDER — BUDESONIDE AND FORMOTEROL FUMARATE DIHYDRATE 160; 4.5 UG/1; UG/1
10.2 AEROSOL RESPIRATORY (INHALATION) DAILY PRN
COMMUNITY
Start: 2022-05-29 | End: 2022-06-08

## 2022-06-08 RX ORDER — PREDNISONE 10 MG/1
40 TABLET ORAL DAILY
Qty: 28 TABLET | Refills: 0 | Status: ON HOLD | OUTPATIENT
Start: 2022-06-08 | End: 2022-06-13 | Stop reason: SDUPTHER

## 2022-06-08 RX ORDER — SODIUM CHLORIDE 0.9 % (FLUSH) 0.9 %
10 SYRINGE (ML) INJECTION
Status: DISCONTINUED | OUTPATIENT
Start: 2022-06-08 | End: 2022-06-13 | Stop reason: HOSPADM

## 2022-06-08 NOTE — H&P (VIEW-ONLY)
"[facility] OTOLARYNGOLOGY H&P NOTE      CHIEF COMPLAINT:   Chief Complaint   Patient presents with    ct results       HISTORY OF PRESENT ILLNESS:   March 29, 2022:  31-year-old female with a history of allergic rhinitis and nasal polyps referred by Dr. Eduardo Reno for evaluation of persistent nasal obstruction. Patient is indicated that she has had problems with allergic rhinitis since childhood and has been on allergy shots several times. She had restarted allergy shots 2 weeks ago after delivering her third baby.  She is indicated this has been a problem since childhood. She has perennial nasal congestion and postnasal drainage which is worse in the spring and during her pregnancies. Other symptoms include periodic stopped up sensation in her ears, itchy ears, sore throat and itchy throat, cough, diminished sense of smell and diminished sense of taste. Currently she has no sense of smell and very little taste. She had indicated that she has been on antibiotics nearly monthly for sinusitis. She had completed a course of prednisone 4 days ago when she was taking 20 mg daily for approximately 6days.  Currently she is on fluticasone nasal spray and levocetirizine. She had been on Astelin in the past which had provided significant relief. She had taken it in conjunction with fluticasone nasal spray she is also on saline nasal irrigations which are partially helpful. She has been on multiple other antihistamines.  Patient has been on Dupixent for eczema and she had noticed that it did help with her sinuses "some". She took for approximately 1 year and was taken off in May 2021 when she was found to be pregnant.  She also has a history of eczema and asthma. She also gives a history allergy to aspirin where she has an irregular heartbeat in her throat "closes up". She has taken ibuprofen in the past without incident.  She has never had any sinus surgery and has not had any CT scans " "of the sinuses.  She is a former smoker having quit in 2017. She smoked for approximately 6 years and was only a "social" smoker. She drinks alcohol occasionally and does not use illicit drugs. Review of Systems     2022:  Patient presenting in follow-up.  She could her Flonase.  She has not done any irrigations.  She continues to experience runny itchy eyes, runny nose, anosmia, nasal obstruction, facial pressure with intermittent worsening and chronic postnasal drip.  Patient notes that she has previously taken aspirin after which she had palpitations but she is unsure if it affected her breathing.  She is currently on Dupixent for her eczema and asthma however, this has not helped with her nasal obstruction.  She does not currently smoke or drink Excessive, alcohol.  Denies all other issues      ROS: Reviewed   PAST MEDICAL HISTORY:  Past Medical History:   Diagnosis Date    Acne     Asthma       PAST SURGICAL HISTORY:  Past Surgical History:   Procedure Laterality Date     SECTION N/A       SOCIAL HISTORY:  Social History     Socioeconomic History    Marital status: Single   Tobacco Use    Smoking status: Never Smoker    Smokeless tobacco: Never Used   Substance and Sexual Activity    Sexual activity: Yes      ALLERGIES:  Review of patient's allergies indicates:   Allergen Reactions    Iodine Shortness Of Breath and Swelling    Ketorolac Anaphylaxis and Itching    Meloxicam Hives and Swelling     Other reaction(s): Weal (disorder)      Methocarbamol Anaphylaxis    Tramadol Anaphylaxis    Trazodone Anaphylaxis and Palpitations    Peanut       FAMILY HISTORY:  Family History   Problem Relation Age of Onset    Allergies Mother     Hypertension Mother     Diabetes Father     Hypertension Father     Heart failure Father     Stroke Father     Clotting disorder Father     Seizures Father     Allergies Sister     Hypertension Sister       CURRENT MEDICATIONS:  Current Outpatient " Medications on File Prior to Visit   Medication Sig Dispense Refill    [DISCONTINUED] SYMBICORT 160-4.5 mcg/actuation HFAA Inhale 10.2 puffs into the lungs daily as needed.       No current facility-administered medications on file prior to visit.        PHYSICAL EXAMINATION:  Vitals:    06/08/22 0943   BP: 117/78   Pulse: 76   Temp: 97 °F (36.1 °C)      General: Well-developed well-nourished female in no acute distress. She does have hyponasal speech.  Head and face: Normocephalic. No facial lesions. No temporomandibular joint tenderness or click.  Ears: She has partial cerumen impactions bilaterally which were removed with curette. Canals are otherwise unremarkable. Tympanic membranes are nonerythematous. No middle ear effusions. Auricles are developed normally.  Nose: Nasal dorsum unremarkable. She does have right septal deviation. She does have moderately severe intranasal congestion with pale boggy mucosa and clear secretions. No polyps are visible on exam with nasal speculum on the right. (+) left sided polyp medial to middle turbinate. No purulent drainage.  Oral cavity and oropharynx: Mucosa is moist. Tongue and floor mouth without lesions. Tongue is somewhat enlarged. She does have 2+ tonsil hypertrophy with some elongation of the soft palate and some crowding of the oropharynx.  Neck: Supple without palpable adenopathy or thyromegaly. Trachea is in the midline. Parotid and submandibular glands are normal to palpation.  Eyes: Extraocular muscles are intact bilaterally. No exophthalmos or enophthalmos.  Neurologic: Alert and oriented x3. Cranial nerves II through XII are grossly normal.    LABS:    RADIOLOGY (Personally reviewed by me):      ASSESSMENT/PLAN:    Sampson Ly is a 31 y.o. 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  Patient has been on antihistamines, nasal steroids and Dupixent (dosed for  asthma). Ct scan with a large polyp burden, right narvaez septal deviation and left sided bertin bullosa.    Plan:  -- We discussed risks and benefits of continued medical therapy versus surgery at this time.  Patient would like to proceed with surgery.  Risks, benefits and indications for a functional endoscopic sinus surgery including septoplasty and bertin bullosa resection were discussed.  Patient endorses understanding and would like to proceed on 06/13/2022.  -- Prednisone 40mg daily 1 week. Will taper after surgery  -- NSI BID. Instructions provided  -- I have discussed with patient to discuss aspirin sensitivity testing with her allergy provider.  If sensitive she may benefit from desensitization to control both her asthma and polyposis burden.  Additionally I have also discussed dosing of Dupixent to be increased for nasal polyposis indication postoperatively.  I believe this will allow her to optimize both her asthma and nasal polyposis control.     RTC 2 week julia-yamileth Fischer MD   [facility]  PGY 5

## 2022-06-08 NOTE — PROGRESS NOTES
"[facility] OTOLARYNGOLOGY H&P NOTE      CHIEF COMPLAINT:   Chief Complaint   Patient presents with    ct results       HISTORY OF PRESENT ILLNESS:   March 29, 2022:  31-year-old female with a history of allergic rhinitis and nasal polyps referred by Dr. Eduardo Reno for evaluation of persistent nasal obstruction. Patient is indicated that she has had problems with allergic rhinitis since childhood and has been on allergy shots several times. She had restarted allergy shots 2 weeks ago after delivering her third baby.  She is indicated this has been a problem since childhood. She has perennial nasal congestion and postnasal drainage which is worse in the spring and during her pregnancies. Other symptoms include periodic stopped up sensation in her ears, itchy ears, sore throat and itchy throat, cough, diminished sense of smell and diminished sense of taste. Currently she has no sense of smell and very little taste. She had indicated that she has been on antibiotics nearly monthly for sinusitis. She had completed a course of prednisone 4 days ago when she was taking 20 mg daily for approximately 6days.  Currently she is on fluticasone nasal spray and levocetirizine. She had been on Astelin in the past which had provided significant relief. She had taken it in conjunction with fluticasone nasal spray she is also on saline nasal irrigations which are partially helpful. She has been on multiple other antihistamines.  Patient has been on Dupixent for eczema and she had noticed that it did help with her sinuses "some". She took for approximately 1 year and was taken off in May 2021 when she was found to be pregnant.  She also has a history of eczema and asthma. She also gives a history allergy to aspirin where she has an irregular heartbeat in her throat "closes up". She has taken ibuprofen in the past without incident.  She has never had any sinus surgery and has not had any CT scans " "of the sinuses.  She is a former smoker having quit in 2017. She smoked for approximately 6 years and was only a "social" smoker. She drinks alcohol occasionally and does not use illicit drugs. Review of Systems     2022:  Patient presenting in follow-up.  She could her Flonase.  She has not done any irrigations.  She continues to experience runny itchy eyes, runny nose, anosmia, nasal obstruction, facial pressure with intermittent worsening and chronic postnasal drip.  Patient notes that she has previously taken aspirin after which she had palpitations but she is unsure if it affected her breathing.  She is currently on Dupixent for her eczema and asthma however, this has not helped with her nasal obstruction.  She does not currently smoke or drink Excessive, alcohol.  Denies all other issues      ROS: Reviewed   PAST MEDICAL HISTORY:  Past Medical History:   Diagnosis Date    Acne     Asthma       PAST SURGICAL HISTORY:  Past Surgical History:   Procedure Laterality Date     SECTION N/A       SOCIAL HISTORY:  Social History     Socioeconomic History    Marital status: Single   Tobacco Use    Smoking status: Never Smoker    Smokeless tobacco: Never Used   Substance and Sexual Activity    Sexual activity: Yes      ALLERGIES:  Review of patient's allergies indicates:   Allergen Reactions    Iodine Shortness Of Breath and Swelling    Ketorolac Anaphylaxis and Itching    Meloxicam Hives and Swelling     Other reaction(s): Weal (disorder)      Methocarbamol Anaphylaxis    Tramadol Anaphylaxis    Trazodone Anaphylaxis and Palpitations    Peanut       FAMILY HISTORY:  Family History   Problem Relation Age of Onset    Allergies Mother     Hypertension Mother     Diabetes Father     Hypertension Father     Heart failure Father     Stroke Father     Clotting disorder Father     Seizures Father     Allergies Sister     Hypertension Sister       CURRENT MEDICATIONS:  Current Outpatient " Medications on File Prior to Visit   Medication Sig Dispense Refill    [DISCONTINUED] SYMBICORT 160-4.5 mcg/actuation HFAA Inhale 10.2 puffs into the lungs daily as needed.       No current facility-administered medications on file prior to visit.        PHYSICAL EXAMINATION:  Vitals:    06/08/22 0943   BP: 117/78   Pulse: 76   Temp: 97 °F (36.1 °C)      General: Well-developed well-nourished female in no acute distress. She does have hyponasal speech.  Head and face: Normocephalic. No facial lesions. No temporomandibular joint tenderness or click.  Ears: She has partial cerumen impactions bilaterally which were removed with curette. Canals are otherwise unremarkable. Tympanic membranes are nonerythematous. No middle ear effusions. Auricles are developed normally.  Nose: Nasal dorsum unremarkable. She does have right septal deviation. She does have moderately severe intranasal congestion with pale boggy mucosa and clear secretions. No polyps are visible on exam with nasal speculum on the right. (+) left sided polyp medial to middle turbinate. No purulent drainage.  Oral cavity and oropharynx: Mucosa is moist. Tongue and floor mouth without lesions. Tongue is somewhat enlarged. She does have 2+ tonsil hypertrophy with some elongation of the soft palate and some crowding of the oropharynx.  Neck: Supple without palpable adenopathy or thyromegaly. Trachea is in the midline. Parotid and submandibular glands are normal to palpation.  Eyes: Extraocular muscles are intact bilaterally. No exophthalmos or enophthalmos.  Neurologic: Alert and oriented x3. Cranial nerves II through XII are grossly normal.    LABS:    RADIOLOGY (Personally reviewed by me):      ASSESSMENT/PLAN:    Sampson Ly is a 31 y.o. 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  Patient has been on antihistamines, nasal steroids and Dupixent (dosed for  asthma). Ct scan with a large polyp burden, right narvaez septal deviation and left sided bertin bullosa.    Plan:  -- We discussed risks and benefits of continued medical therapy versus surgery at this time.  Patient would like to proceed with surgery.  Risks, benefits and indications for a functional endoscopic sinus surgery including septoplasty and bertin bullosa resection were discussed.  Patient endorses understanding and would like to proceed on 06/13/2022.  -- Prednisone 40mg daily 1 week. Will taper after surgery  -- NSI BID. Instructions provided  -- I have discussed with patient to discuss aspirin sensitivity testing with her allergy provider.  If sensitive she may benefit from desensitization to control both her asthma and polyposis burden.  Additionally I have also discussed dosing of Dupixent to be increased for nasal polyposis indication postoperatively.  I believe this will allow her to optimize both her asthma and nasal polyposis control.     RTC 2 week julia-yamileth Fischer MD   [facility] Department of Otolaryngology  PGY 5

## 2022-06-08 NOTE — PROGRESS NOTES
I have reviewed the notes, assessments, and/or procedures performed this visit, and I concur with the documentation.    Fritz Martinez M.D.

## 2022-06-08 NOTE — ANESTHESIA PREPROCEDURE EVALUATION
06/08/2022  Sampson Ly is a 31 y.o., female with PMHx of morbid obesity, asthma presents for FESS.    COVID STATUS: VACCINE 1 of 2 (LAST DOSE 2/9/22); TEST DOS    BETA-BLOCKER: NONE    PAT NURSE PHONE INTERVIEW 6/8/222    PROBLEM LIST:  -  CHRONIC RHINOSINUSITIS, NASAL POLYPOSIS  -  UPT STATUS      - S/P 11/10/21 C-SEC  -  MORBID OBESITY  -  ASTHMA - QUICK RELIEF USE 1-2x/WEEK, NEB Tx 1x/MONTH      - HOSP. 4/29-5/2/18 w/ASTHMA EXAC.  -  ECZEMA  -  Hx 2014 SI, AH (POST-DEATH of FATHER)  -  PAST SMOKER (IN [facility]) BUT PATIENT DENIES SMOKE Hx  -  ETOH 1-2 x/MONTH    AM Rx DOS: SYMBICORT, ALBUTEROL INHAL or NEB Tx PRN    ORDERS -   SURGEON: 11/16/17 CT THORAX; 4/30/18 EKG; 3/23/19 CXR; 6/26/21 CBC, CMP;  ANESTHESIA: ADD: EKG (PREV. ABN.); UPT    Pre-op Assessment    I have reviewed the Patient Summary Reports.     I have reviewed the Nursing Notes. I have reviewed the NPO Status.   I have reviewed the Medications.     Review of Systems  Anesthesia Hx:  Denies Family Hx of Anesthesia complications.   Denies Personal Hx of Anesthesia complications.       Physical Exam  General: Alert    Airway:  Mallampati: II   Mouth Opening: Normal  TM Distance: Normal  Tongue: Normal  Neck ROM: Normal ROM    Dental:  Intact    Chest/Lungs:  Clear to auscultation, Normal Respiratory Rate    Heart:  Rate: Normal  Rhythm: Regular Rhythm      Lab Results   Component Value Date    WBC 4.8 06/26/2021    HGB 11.2 (L) 06/26/2021    HCT 34.1 (L) 06/26/2021    MCV 79.5 (L) 06/26/2021     06/26/2021       CMP  Sodium Level   Date Value Ref Range Status   06/26/2021 135 (L) 136 - 145 mmol/L Final     Potassium Level   Date Value Ref Range Status   06/26/2021 3.4 (L) 3.5 - 5.1 mmol/L Final     Carbon Dioxide   Date Value Ref Range Status   06/26/2021 18 (L) 22 - 29 mmol/L Final     Blood Urea Nitrogen   Date Value Ref Range  Status   06/26/2021 4.2 (L) 7.0 - 18.7 mg/dL Final     Creatinine   Date Value Ref Range Status   06/26/2021 0.59 0.55 - 1.02 mg/dL Final     Calcium Level Total   Date Value Ref Range Status   06/26/2021 9.0 8.4 - 10.2 mg/dL Final     Albumin Level   Date Value Ref Range Status   06/26/2021 2.7 (L) 3.5 - 5.0 gm/dL Final     Bilirubin Total   Date Value Ref Range Status   06/26/2021 0.3 <<=1.5 mg/dL Final     Alkaline Phosphatase   Date Value Ref Range Status   06/26/2021 58 40 - 150 unit/L Final     Aspartate Aminotransferase   Date Value Ref Range Status   06/26/2021 30 5 - 34 unit/L Final     Alanine Aminotransferase   Date Value Ref Range Status   06/26/2021 64 (H) 0 - 55 unit/L Final     Estimated GFR-Non    Date Value Ref Range Status   06/26/2021 >60 mL/min/1.73 m2 Final             Anesthesia Plan  Type of Anesthesia, risks & benefits discussed:    Anesthesia Type: Gen ETT  Intra-op Monitoring Plan: Standard ASA Monitors  Post Op Pain Control Plan: multimodal analgesia and IV/PO Opioids PRN  Induction:  IV  Airway Plan: Direct  Informed Consent: Informed consent signed with the Patient and all parties understand the risks and agree with anesthesia plan.  All questions answered.   ASA Score: 2  Day of Surgery Review of History & Physical: H&P Update referred to the surgeon/provider.I have interviewed and examined the patient. I have reviewed the patient's H&P dated: There are no significant changes. H&P completed by Anesthesiologist.    Ready For Surgery From Anesthesia Perspective.     .

## 2022-06-13 ENCOUNTER — ANESTHESIA (OUTPATIENT)
Dept: SURGERY | Facility: HOSPITAL | Age: 32
End: 2022-06-13
Payer: MEDICAID

## 2022-06-13 ENCOUNTER — HOSPITAL ENCOUNTER (OUTPATIENT)
Dept: CARDIOLOGY | Facility: HOSPITAL | Age: 32
Discharge: HOME | End: 2022-06-13
Payer: MEDICAID

## 2022-06-13 ENCOUNTER — HOSPITAL ENCOUNTER (OUTPATIENT)
Facility: HOSPITAL | Age: 32
Discharge: HOME | End: 2022-06-13
Attending: OTOLARYNGOLOGY | Admitting: OTOLARYNGOLOGY
Payer: MEDICAID

## 2022-06-13 VITALS
SYSTOLIC BLOOD PRESSURE: 140 MMHG | TEMPERATURE: 99 F | OXYGEN SATURATION: 97 % | WEIGHT: 245 LBS | RESPIRATION RATE: 17 BRPM | BODY MASS INDEX: 40.82 KG/M2 | HEART RATE: 92 BPM | DIASTOLIC BLOOD PRESSURE: 86 MMHG | HEIGHT: 65 IN

## 2022-06-13 DIAGNOSIS — Z01.818 OTHER SPECIFIED PRE-OPERATIVE EXAMINATION: ICD-10-CM

## 2022-06-13 DIAGNOSIS — J32.4 CHRONIC PANSINUSITIS: ICD-10-CM

## 2022-06-13 DIAGNOSIS — J34.2 NASAL SEPTAL DEVIATION: ICD-10-CM

## 2022-06-13 DIAGNOSIS — J32.9 CHRONIC RHINOSINUSITIS: Primary | ICD-10-CM

## 2022-06-13 DIAGNOSIS — J33.9 NASAL POLYPOSIS: ICD-10-CM

## 2022-06-13 LAB
B-HCG UR QL: NEGATIVE
CTP QC/QA: YES
CTP QC/QA: YES
SARS-COV-2 AG RESP QL IA.RAPID: NEGATIVE

## 2022-06-13 PROCEDURE — 25000003 PHARM REV CODE 250: Performed by: NURSE ANESTHETIST, CERTIFIED REGISTERED

## 2022-06-13 PROCEDURE — 81025 URINE PREGNANCY TEST: CPT | Performed by: NURSE PRACTITIONER

## 2022-06-13 PROCEDURE — 63600175 PHARM REV CODE 636 W HCPCS: Performed by: ANESTHESIOLOGY

## 2022-06-13 PROCEDURE — 00160 ANES PX NOSE&SINUS NOS: CPT | Performed by: OTOLARYNGOLOGY

## 2022-06-13 PROCEDURE — 36000710: Performed by: OTOLARYNGOLOGY

## 2022-06-13 PROCEDURE — 37000008 HC ANESTHESIA 1ST 15 MINUTES: Performed by: OTOLARYNGOLOGY

## 2022-06-13 PROCEDURE — 25000003 PHARM REV CODE 250: Performed by: ANESTHESIOLOGY

## 2022-06-13 PROCEDURE — 63600175 PHARM REV CODE 636 W HCPCS: Performed by: OTOLARYNGOLOGY

## 2022-06-13 PROCEDURE — 36000711: Performed by: OTOLARYNGOLOGY

## 2022-06-13 PROCEDURE — 93005 ELECTROCARDIOGRAM TRACING: CPT

## 2022-06-13 PROCEDURE — 25000242 PHARM REV CODE 250 ALT 637 W/ HCPCS: Performed by: ANESTHESIOLOGY

## 2022-06-13 PROCEDURE — 71000033 HC RECOVERY, INTIAL HOUR: Performed by: OTOLARYNGOLOGY

## 2022-06-13 PROCEDURE — 27201423 OPTIME MED/SURG SUP & DEVICES STERILE SUPPLY: Performed by: OTOLARYNGOLOGY

## 2022-06-13 PROCEDURE — 37000009 HC ANESTHESIA EA ADD 15 MINS: Performed by: OTOLARYNGOLOGY

## 2022-06-13 PROCEDURE — 63600175 PHARM REV CODE 636 W HCPCS: Performed by: NURSE ANESTHETIST, CERTIFIED REGISTERED

## 2022-06-13 PROCEDURE — 25000003 PHARM REV CODE 250: Performed by: OTOLARYNGOLOGY

## 2022-06-13 RX ORDER — KETAMINE HCL IN 0.9 % NACL 50 MG/5 ML
SYRINGE (ML) INTRAVENOUS
Status: DISCONTINUED | OUTPATIENT
Start: 2022-06-13 | End: 2022-06-13

## 2022-06-13 RX ORDER — LIDOCAINE HCL/EPINEPHRINE/PF 2%-1:200K
VIAL (ML) INJECTION
Status: DISCONTINUED | OUTPATIENT
Start: 2022-06-13 | End: 2022-06-13 | Stop reason: HOSPADM

## 2022-06-13 RX ORDER — DROPERIDOL 2.5 MG/ML
0.25 INJECTION, SOLUTION INTRAMUSCULAR; INTRAVENOUS ONCE AS NEEDED
Status: DISCONTINUED | OUTPATIENT
Start: 2022-06-13 | End: 2022-06-13

## 2022-06-13 RX ORDER — METOCLOPRAMIDE HYDROCHLORIDE 5 MG/ML
10 INJECTION INTRAMUSCULAR; INTRAVENOUS ONCE AS NEEDED
Status: DISCONTINUED | OUTPATIENT
Start: 2022-06-13 | End: 2022-06-13

## 2022-06-13 RX ORDER — ONDANSETRON 2 MG/ML
INJECTION INTRAMUSCULAR; INTRAVENOUS
Status: DISCONTINUED | OUTPATIENT
Start: 2022-06-13 | End: 2022-06-13

## 2022-06-13 RX ORDER — REMIFENTANIL HYDROCHLORIDE 1 MG/ML
INJECTION, POWDER, LYOPHILIZED, FOR SOLUTION INTRAVENOUS CONTINUOUS PRN
Status: DISCONTINUED | OUTPATIENT
Start: 2022-06-13 | End: 2022-06-13

## 2022-06-13 RX ORDER — ROPIVACAINE HYDROCHLORIDE 5 MG/ML
INJECTION, SOLUTION EPIDURAL; INFILTRATION; PERINEURAL
Status: DISCONTINUED | OUTPATIENT
Start: 2022-06-13 | End: 2022-06-13 | Stop reason: HOSPADM

## 2022-06-13 RX ORDER — ACETAMINOPHEN 500 MG
1000 TABLET ORAL EVERY 8 HOURS
Qty: 24 TABLET | Refills: 0 | Status: SHIPPED | OUTPATIENT
Start: 2022-06-13 | End: 2022-06-17

## 2022-06-13 RX ORDER — LIDOCAINE HYDROCHLORIDE 10 MG/ML
1 INJECTION, SOLUTION EPIDURAL; INFILTRATION; INTRACAUDAL; PERINEURAL ONCE
Status: DISCONTINUED | OUTPATIENT
Start: 2022-06-13 | End: 2022-06-14 | Stop reason: HOSPADM

## 2022-06-13 RX ORDER — OXYCODONE HYDROCHLORIDE 5 MG/1
5 TABLET ORAL EVERY 6 HOURS PRN
Qty: 12 TABLET | Refills: 0 | Status: SHIPPED | OUTPATIENT
Start: 2022-06-13 | End: 2022-06-17

## 2022-06-13 RX ORDER — SODIUM CHLORIDE, SODIUM LACTATE, POTASSIUM CHLORIDE, CALCIUM CHLORIDE 600; 310; 30; 20 MG/100ML; MG/100ML; MG/100ML; MG/100ML
INJECTION, SOLUTION INTRAVENOUS CONTINUOUS
Status: DISCONTINUED | OUTPATIENT
Start: 2022-06-13 | End: 2022-06-14 | Stop reason: HOSPADM

## 2022-06-13 RX ORDER — IPRATROPIUM BROMIDE AND ALBUTEROL SULFATE 2.5; .5 MG/3ML; MG/3ML
3 SOLUTION RESPIRATORY (INHALATION) ONCE
Status: COMPLETED | OUTPATIENT
Start: 2022-06-13 | End: 2022-06-13

## 2022-06-13 RX ORDER — PROPOFOL 10 MG/ML
VIAL (ML) INTRAVENOUS
Status: DISCONTINUED | OUTPATIENT
Start: 2022-06-13 | End: 2022-06-13

## 2022-06-13 RX ORDER — ROPIVACAINE HYDROCHLORIDE 5 MG/ML
INJECTION, SOLUTION EPIDURAL; INFILTRATION; PERINEURAL
Status: DISCONTINUED
Start: 2022-06-13 | End: 2022-06-14 | Stop reason: HOSPADM

## 2022-06-13 RX ORDER — HYDROMORPHONE HYDROCHLORIDE 1 MG/ML
0.2 INJECTION, SOLUTION INTRAMUSCULAR; INTRAVENOUS; SUBCUTANEOUS EVERY 5 MIN PRN
Status: DISCONTINUED | OUTPATIENT
Start: 2022-06-13 | End: 2022-06-13

## 2022-06-13 RX ORDER — ROCURONIUM BROMIDE 10 MG/ML
INJECTION, SOLUTION INTRAVENOUS
Status: DISCONTINUED | OUTPATIENT
Start: 2022-06-13 | End: 2022-06-13

## 2022-06-13 RX ORDER — DIPHENHYDRAMINE HYDROCHLORIDE 50 MG/ML
6.25 INJECTION INTRAMUSCULAR; INTRAVENOUS ONCE AS NEEDED
Status: COMPLETED | OUTPATIENT
Start: 2022-06-13 | End: 2022-06-13

## 2022-06-13 RX ORDER — LIDOCAINE HYDROCHLORIDE 20 MG/ML
INJECTION, SOLUTION EPIDURAL; INFILTRATION; INTRACAUDAL; PERINEURAL
Status: DISCONTINUED | OUTPATIENT
Start: 2022-06-13 | End: 2022-06-13

## 2022-06-13 RX ORDER — HYDROMORPHONE HYDROCHLORIDE 1 MG/ML
INJECTION, SOLUTION INTRAMUSCULAR; INTRAVENOUS; SUBCUTANEOUS
Status: DISCONTINUED | OUTPATIENT
Start: 2022-06-13 | End: 2022-06-13

## 2022-06-13 RX ORDER — PROPOFOL 10 MG/ML
VIAL (ML) INTRAVENOUS CONTINUOUS PRN
Status: DISCONTINUED | OUTPATIENT
Start: 2022-06-13 | End: 2022-06-13

## 2022-06-13 RX ORDER — DEXAMETHASONE SODIUM PHOSPHATE 4 MG/ML
INJECTION, SOLUTION INTRA-ARTICULAR; INTRALESIONAL; INTRAMUSCULAR; INTRAVENOUS; SOFT TISSUE
Status: DISCONTINUED | OUTPATIENT
Start: 2022-06-13 | End: 2022-06-13

## 2022-06-13 RX ORDER — CEFAZOLIN SODIUM 1 G/3ML
INJECTION, POWDER, FOR SOLUTION INTRAMUSCULAR; INTRAVENOUS
Status: DISCONTINUED | OUTPATIENT
Start: 2022-06-13 | End: 2022-06-13

## 2022-06-13 RX ORDER — LABETALOL HCL 20 MG/4 ML
SYRINGE (ML) INTRAVENOUS
Status: DISCONTINUED
Start: 2022-06-13 | End: 2022-06-14 | Stop reason: HOSPADM

## 2022-06-13 RX ORDER — PREDNISONE 10 MG/1
TABLET ORAL
Qty: 21 TABLET | Refills: 0 | Status: SHIPPED | OUTPATIENT
Start: 2022-06-13 | End: 2022-06-26

## 2022-06-13 RX ORDER — EPINEPHRINE 1 MG/ML
INJECTION INTRAMUSCULAR; INTRAVENOUS; SUBCUTANEOUS
Status: DISCONTINUED
Start: 2022-06-13 | End: 2022-06-14 | Stop reason: HOSPADM

## 2022-06-13 RX ORDER — MIDAZOLAM HYDROCHLORIDE 1 MG/ML
2 INJECTION INTRAMUSCULAR; INTRAVENOUS ONCE AS NEEDED
Status: COMPLETED | OUTPATIENT
Start: 2022-06-13 | End: 2022-06-13

## 2022-06-13 RX ORDER — LABETALOL HCL 20 MG/4 ML
2.5 SYRINGE (ML) INTRAVENOUS ONCE
Status: DISCONTINUED | OUTPATIENT
Start: 2022-06-13 | End: 2022-06-13

## 2022-06-13 RX ORDER — OXYMETAZOLINE HCL 0.05 %
SPRAY, NON-AEROSOL (ML) NASAL
Status: DISCONTINUED
Start: 2022-06-13 | End: 2022-06-13 | Stop reason: WASHOUT

## 2022-06-13 RX ORDER — BACITRACIN 500 [USP'U]/G
OINTMENT TOPICAL
Status: DISCONTINUED
Start: 2022-06-13 | End: 2022-06-13 | Stop reason: WASHOUT

## 2022-06-13 RX ORDER — FENTANYL CITRATE 50 UG/ML
INJECTION, SOLUTION INTRAMUSCULAR; INTRAVENOUS
Status: DISCONTINUED | OUTPATIENT
Start: 2022-06-13 | End: 2022-06-13

## 2022-06-13 RX ORDER — MIDAZOLAM HYDROCHLORIDE 1 MG/ML
INJECTION INTRAMUSCULAR; INTRAVENOUS
Status: DISCONTINUED | OUTPATIENT
Start: 2022-06-13 | End: 2022-06-13

## 2022-06-13 RX ORDER — OXYMETAZOLINE HCL 0.05 %
SPRAY, NON-AEROSOL (ML) NASAL
Status: DISCONTINUED | OUTPATIENT
Start: 2022-06-13 | End: 2022-06-13 | Stop reason: HOSPADM

## 2022-06-13 RX ORDER — EPINEPHRINE 1 MG/ML
INJECTION INTRAMUSCULAR; INTRAVENOUS; SUBCUTANEOUS
Status: DISCONTINUED | OUTPATIENT
Start: 2022-06-13 | End: 2022-06-13 | Stop reason: HOSPADM

## 2022-06-13 RX ORDER — OXYCODONE HYDROCHLORIDE 5 MG/1
5 TABLET ORAL
Status: DISCONTINUED | OUTPATIENT
Start: 2022-06-13 | End: 2022-06-14 | Stop reason: HOSPADM

## 2022-06-13 RX ADMIN — ROCURONIUM BROMIDE 20 MG: 10 SOLUTION INTRAVENOUS at 01:06

## 2022-06-13 RX ADMIN — CEFAZOLIN 2 G: 330 INJECTION, POWDER, FOR SOLUTION INTRAMUSCULAR; INTRAVENOUS at 01:06

## 2022-06-13 RX ADMIN — PROPOFOL 50 MG: 10 INJECTION, EMULSION INTRAVENOUS at 01:06

## 2022-06-13 RX ADMIN — PROPOFOL 200 MCG/KG/MIN: 10 INJECTION, EMULSION INTRAVENOUS at 01:06

## 2022-06-13 RX ADMIN — DIPHENHYDRAMINE HYDROCHLORIDE 6.25 MG: 50 INJECTION INTRAMUSCULAR; INTRAVENOUS at 06:06

## 2022-06-13 RX ADMIN — ROCURONIUM BROMIDE 20 MG: 10 SOLUTION INTRAVENOUS at 02:06

## 2022-06-13 RX ADMIN — HYDROMORPHONE HYDROCHLORIDE 0.5 MG: 1 INJECTION, SOLUTION INTRAMUSCULAR; INTRAVENOUS; SUBCUTANEOUS at 06:06

## 2022-06-13 RX ADMIN — PROPOFOL 50 MG: 10 INJECTION, EMULSION INTRAVENOUS at 02:06

## 2022-06-13 RX ADMIN — FENTANYL CITRATE 50 MCG: 50 INJECTION, SOLUTION INTRAMUSCULAR; INTRAVENOUS at 01:06

## 2022-06-13 RX ADMIN — ROCURONIUM BROMIDE 20 MG: 10 SOLUTION INTRAVENOUS at 03:06

## 2022-06-13 RX ADMIN — PROPOFOL 160 MG: 10 INJECTION, EMULSION INTRAVENOUS at 01:06

## 2022-06-13 RX ADMIN — DEXAMETHASONE SODIUM PHOSPHATE 8 MG: 4 INJECTION, SOLUTION INTRA-ARTICULAR; INTRALESIONAL; INTRAMUSCULAR; INTRAVENOUS; SOFT TISSUE at 01:06

## 2022-06-13 RX ADMIN — SUGAMMADEX 225 MG: 100 INJECTION, SOLUTION INTRAVENOUS at 06:06

## 2022-06-13 RX ADMIN — Medication 0.05 MCG/KG/MIN: at 01:06

## 2022-06-13 RX ADMIN — ROCURONIUM BROMIDE 50 MG: 10 SOLUTION INTRAVENOUS at 01:06

## 2022-06-13 RX ADMIN — OXYCODONE HYDROCHLORIDE 5 MG: 5 TABLET ORAL at 09:06

## 2022-06-13 RX ADMIN — ROCURONIUM BROMIDE 20 MG: 10 SOLUTION INTRAVENOUS at 06:06

## 2022-06-13 RX ADMIN — MIDAZOLAM HYDROCHLORIDE 2 MG: 1 INJECTION, SOLUTION INTRAMUSCULAR; INTRAVENOUS at 12:06

## 2022-06-13 RX ADMIN — LIDOCAINE HYDROCHLORIDE 100 MG: 20 INJECTION, SOLUTION EPIDURAL; INFILTRATION; INTRACAUDAL; PERINEURAL at 01:06

## 2022-06-13 RX ADMIN — Medication 0.05 MCG/KG/MIN: at 04:06

## 2022-06-13 RX ADMIN — IPRATROPIUM BROMIDE AND ALBUTEROL SULFATE 3 ML: 2.5; .5 SOLUTION RESPIRATORY (INHALATION) at 12:06

## 2022-06-13 RX ADMIN — ONDANSETRON 4 MG: 2 INJECTION INTRAMUSCULAR; INTRAVENOUS at 06:06

## 2022-06-13 RX ADMIN — CEFAZOLIN 2 G: 330 INJECTION, POWDER, FOR SOLUTION INTRAMUSCULAR; INTRAVENOUS at 05:06

## 2022-06-13 RX ADMIN — MIDAZOLAM 2 MG: 1 INJECTION INTRAMUSCULAR; INTRAVENOUS at 06:06

## 2022-06-13 RX ADMIN — Medication 0.07 MCG/KG/MIN: at 05:06

## 2022-06-13 RX ADMIN — SODIUM CHLORIDE, POTASSIUM CHLORIDE, SODIUM LACTATE AND CALCIUM CHLORIDE: 600; 310; 30; 20 INJECTION, SOLUTION INTRAVENOUS at 12:06

## 2022-06-13 RX ADMIN — SODIUM CHLORIDE, POTASSIUM CHLORIDE, SODIUM LACTATE AND CALCIUM CHLORIDE: 600; 310; 30; 20 INJECTION, SOLUTION INTRAVENOUS at 06:06

## 2022-06-13 RX ADMIN — Medication 5 MG: at 06:06

## 2022-06-13 RX ADMIN — Medication 10 MG: at 06:06

## 2022-06-13 NOTE — OP NOTE
[facility] Otolaryngology Operative Note    Sampson Ly  [id number]  1990  Date of procedure: 06/13/2022    PRE-OPERATIVE DIAGNOSIS:  1. Chronic pansinusitis  2. Pansinusitis  3. Nasal septal deviation with spur (to right)   4. Left bertin bullosa    POST-OPERATIVE DIAGNOSIS:   1. Chronic pansinusitis  2. Pansinusitis  3. Nasal septal deviation with spur (to right)  4. Left bertin bullosa    PROCEDURE:  1. Bilateral maxillary antrostomy with tissue removal  2. Bilateral total ethmoidectomy with tissue removal on the left  3. Bilateral sphenoidectomy  4. Bilateral frontal sinusotomy  5. Septoplasty  6. Bertin bullosa resection, left  6. CT stealth guidance use    FINDINGS:  Generalized mucosal edema, left middle meatus and ethmoid polyposis; left maxillary mucocele; Rightward septal deviation with spur; left bertin bullosa with lateralization of the uncinate process.    SUPERVISING SURGEON: Fritz Martinez MD    PRIMARY RESIDENT SURGEON: Agustina Fischer MD  ASSISTING SURGEON: Susanna Christian MD    ANESTHESIA: TIVA    COMPLICATIONS: none    SPECIMENS:  ID Type Source Tests Collected by Time Destination   A : right sinus nasal contents Tissue Nose, Right SPECIMEN TO PATHOLOGY Fran Mackey RN 6/13/2022 1803    B : left sinus nasal contents Tissue Nose, Left SPECIMEN TO PATHOLOGY Fran Mackey RN 6/13/2022 1804        EBL: 150cc    INDICATION:  Sampson Ly is a 31 y.o. female examined by in Otolaryngology clinic where presentation suggested  septal deviation, bertin bullosa, chronic pansinusitis with nasal polyposis.  Patient has a history significant for allergies and being on dupixent for the last year.  As a result patient was offered elective septoplasty with functional endoscopic sinus surgery. After risks, benefits and alternatives were discussed patient was scheduled for this on 6/13/2022 at [facility].    PROCEDURE IN DETAIL:  The patient was brought to the operating  theater and placed supine on the operating table.  General endotracheal anesthesia was induced.  The patient was prepped and draped in the usual sterile fashion.  CT stealth guidance was set up. Patients eyes were included in the operating field but protected.  Bilateral pledgets with epinehrine and ropivacaine were applied for topical decongestion. Perioperative antibiotics were administered.  A surgeon directed timeout was performed.     We began the procedure with a diagnostic nasal endoscopy.  Patient was noted to have generalized edematous mucosa, right-sided septal deviation and spurring obstructing her right nasal cavity, and she had polyposis in her left middle meatus.  Using a 0 degree nasal endoscope we began with left-sided functional endoscopic sinus surgery.  Patient was noted to have a large bertin bullosa on this left side.  A microdebrider was used to take down polyps coming off of the anterior head of the middle turbinate and the medial aspect of the bertin bullosa.  Patient was then noted to have a lateralized uncinate process which was resected using a backbiter and 45 degree through cut instruments.  We then identified the ethmoid bulla and the basal lamella of the middle turbinates.We removed the ethmoid bulla and proceeded to a posterior ethmoidectomy.  We proceeded to identify the superior turbinate and the  lamina papyracea.   The sphenoid os was then identified and the face of the sphenoid was completely removed.  We then proceeded with posterior to anterior resection of the superior ledges of the remaining ethmoid cells.  Skull base was adequately identified.  Patient was noted to have a frontal ethmoid cell which was taken down to expose the frontal sinus ostia.  All soft tissue and bony ledges were taken down from the frontal outflow tract.   We did visualize the frontal outflow tract using a 70 degree endoscope to ensure all obstructing tissue was adequately removed.  Same with the  maxillary antrostomy.    Using a 0 degree nasal endoscope we began with endoscopic septoplasty.  A [place] incision was made on the right nasal septum to elevate a right-sided mucoperichondral flap.  This flap was extended above, below, posterior to the deviated portion of the cartilage.  Then an incision was made through the caudal cartilage leaving at least a 1 cm strut and a left-sided mucoperichondrial flap was elevated.  Deviated portions of the nasal septum were then resected.  Patient did have a laceration of her right-sided mucoperichondrial flap.  The septum was then coapted anteriorly using quilting sutures.  We then performed the right-sided functional endoscopic sinus surgery. A freer was used tomedialize the middle turbinate.  Patient was then noted to have a lateralized uncinate process which was resected using a backbiter and 45 degree through cut instruments.  We then identified the ethmoid bulla and the basal lamella of the middle turbinates.We removed the ethmoid bulla and proceeded to a posterior ethmoidectomy.  We proceeded to identify the superior turbinate and the  lamina papyracea.   The sphenoid os was then identified and the face of the sphenoid was completely removed.  We then proceeded with posterior to anterior resection of the superior ledges of the remaining ethmoid cells.  Skull base was adequately identified.  Patient was noted to have a frontal ethmoid cell which was taken down to expose the frontal sinus ostia.  All soft tissue and bony ledges were taken down from the frontal outflow tract.   We did visualize the frontal outflow tract using a 70 degree endoscope to ensure all obstructing tissue was adequately removed.  Same with the maxillary antrostomy.    The patient tolerated the procedure well and without complications.  The patient's care was delivered into the hands of the anesthesia team thereafter.  All counts were correct at the end of the procedure.    Dr. Martinez was  present and scrubbed for the entire procedure.    Agustina Fischer  [facility] Department of Otolaryngology   PGY 5

## 2022-06-13 NOTE — ANESTHESIA PROCEDURE NOTES
Intubation    Date/Time: 6/13/2022 1:27 PM  Performed by: Susanna Lemos CRNA  Authorized by: Carline Chapman MD     Intubation:     Induction:  Intravenous    Intubated:  Postinduction    Mask Ventilation:  Easy mask    Attempts:  1    Attempted By:  Student (DARNELL Nguyen)    Method of Intubation:  Direct    Blade:  Sepulveda 2    Laryngeal View Grade: Grade I - full view of cords      Difficult Airway Encountered?: No      Complications:  None    Airway Device:  Oral endotracheal tube    Airway Device Size:  7.0    Style/Cuff Inflation:  Cuffed (inflated to minimal occlusive pressure)    Inflation Amount (mL):  8    Tube secured:  22    Secured at:  The lips    Placement Verified By:  Capnometry    Complicating Factors:  None    Findings Post-Intubation:  BS equal bilateral and atraumatic/condition of teeth unchanged

## 2022-06-14 NOTE — ANESTHESIA POSTPROCEDURE EVALUATION
Anesthesia Post Evaluation    Patient: Sampson Ly    Procedure(s) Performed: Procedure(s) (LRB):  FESS, USING COMPUTER-ASSISTED NAVIGATION (Bilateral)  SEPTOPLASTY, NOSE, ENDOSCOPIC (Bilateral)    Final Anesthesia Type: general      Patient location during evaluation: PACU  Patient participation: Yes- Able to Participate  Level of consciousness: awake and alert  Post-procedure vital signs: reviewed and stable  Pain management: adequate  Airway patency: patent    PONV status at discharge: No PONV  Anesthetic complications: no      Cardiovascular status: hemodynamically stable  Respiratory status: unassisted  Hydration status: euvolemic  Follow-up not needed.          Vitals Value Taken Time   /89 06/13/22 1940   Temp 37.2 °C (99 °F) 06/13/22 1850   Pulse 96 06/13/22 1940   Resp 18 06/13/22 1940   SpO2 97 % 06/13/22 1940         No case tracking events are documented in the log.      Pain/Sury Score: No data recorded

## 2022-06-14 NOTE — TRANSFER OF CARE
"Anesthesia Transfer of Care Note    Patient: Sampson Ly    Procedure(s) Performed: Procedure(s) (LRB):  FESS, USING COMPUTER-ASSISTED NAVIGATION (Bilateral)  SEPTOPLASTY, NOSE, ENDOSCOPIC (Bilateral)    Patient location: PACU    Anesthesia Type: general    Transport from OR: Transported from OR on room air with adequate spontaneous ventilation    Post pain: adequate analgesia    Post assessment: no apparent anesthetic complications    Post vital signs: stable    Level of consciousness: sedated    Nausea/Vomiting: no nausea/vomiting    Complications: none    Transfer of care protocol was followed      Last vitals:   Visit Vitals  /84 (BP Location: Left arm, Patient Position: Lying)   Pulse 85   Temp 36.3 °C (97.3 °F) (Temporal)   Resp 20   Ht 5' 5" (1.651 m)   Wt 111.1 kg (245 lb)   LMP 05/02/2022 (Exact Date)   SpO2 100%   Breastfeeding No   BMI 40.77 kg/m²     "

## 2022-06-15 LAB
ESTROGEN SERPL-MCNC: NORMAL PG/ML
INSULIN SERPL-ACNC: NORMAL U[IU]/ML
LAB AP CLINICAL INFORMATION: NORMAL
LAB AP GROSS DESCRIPTION: NORMAL
LAB AP REPORT FOOTNOTES: NORMAL
T3RU NFR SERPL: NORMAL %

## 2022-06-21 ENCOUNTER — OFFICE VISIT (OUTPATIENT)
Dept: OTOLARYNGOLOGY | Facility: CLINIC | Age: 32
End: 2022-06-21
Payer: MEDICAID

## 2022-06-21 VITALS
TEMPERATURE: 98 F | BODY MASS INDEX: 39.99 KG/M2 | DIASTOLIC BLOOD PRESSURE: 69 MMHG | OXYGEN SATURATION: 98 % | WEIGHT: 240 LBS | HEIGHT: 65 IN | RESPIRATION RATE: 16 BRPM | HEART RATE: 78 BPM | SYSTOLIC BLOOD PRESSURE: 104 MMHG

## 2022-06-21 DIAGNOSIS — Z98.890 S/P NASAL SEPTOPLASTY: ICD-10-CM

## 2022-06-21 DIAGNOSIS — Z98.890 S/P FESS (FUNCTIONAL ENDOSCOPIC SINUS SURGERY): Primary | ICD-10-CM

## 2022-06-21 PROCEDURE — 99213 OFFICE O/P EST LOW 20 MIN: CPT | Mod: PBBFAC,25

## 2022-06-21 PROCEDURE — 31231 NASAL ENDOSCOPY DX: CPT | Mod: PBBFAC | Performed by: STUDENT IN AN ORGANIZED HEALTH CARE EDUCATION/TRAINING PROGRAM

## 2022-06-21 RX ORDER — LIDOCAINE HYDROCHLORIDE 40 MG/ML
2 INJECTION, SOLUTION RETROBULBAR
Status: DISPENSED | OUTPATIENT
Start: 2022-06-21

## 2022-06-21 NOTE — PROGRESS NOTES
"[facility]   [facility] OTOLARYNGOLOGY H&P NOTE      CHIEF COMPLAINT:   Chief Complaint   Patient presents with    Follow-up     Post Op nasal polyps       HISTORY OF PRESENT ILLNESS:   March 29, 2022:  31-year-old female with a history of allergic rhinitis and nasal polyps referred by Dr. Eduardo Reno for evaluation of persistent nasal obstruction. Patient is indicated that she has had problems with allergic rhinitis since childhood and has been on allergy shots several times. She had restarted allergy shots 2 weeks ago after delivering her third baby.  She is indicated this has been a problem since childhood. She has perennial nasal congestion and postnasal drainage which is worse in the spring and during her pregnancies. Other symptoms include periodic stopped up sensation in her ears, itchy ears, sore throat and itchy throat, cough, diminished sense of smell and diminished sense of taste. Currently she has no sense of smell and very little taste. She had indicated that she has been on antibiotics nearly monthly for sinusitis. She had completed a course of prednisone 4 days ago when she was taking 20 mg daily for approximately 6days.  Currently she is on fluticasone nasal spray and levocetirizine. She had been on Astelin in the past which had provided significant relief. She had taken it in conjunction with fluticasone nasal spray she is also on saline nasal irrigations which are partially helpful. She has been on multiple other antihistamines.  Patient has been on Dupixent for eczema and she had noticed that it did help with her sinuses "some". She took for approximately 1 year and was taken off in May 2021 when she was found to be pregnant.  She also has a history of eczema and asthma. She also gives a history allergy to aspirin where she has an irregular heartbeat in her throat "closes up". She has taken ibuprofen in the past without incident.  She has never had any sinus surgery and " "has not had any CT scans of the sinuses.  She is a former smoker having quit in 2017. She smoked for approximately 6 years and was only a "social" smoker. She drinks alcohol occasionally and does not use illicit drugs. Review of Systems     2022:  Patient presenting in follow-up.  She could her Flonase.  She has not done any irrigations.  She continues to experience runny itchy eyes, runny nose, anosmia, nasal obstruction, facial pressure with intermittent worsening and chronic postnasal drip.  Patient notes that she has previously taken aspirin after which she had palpitations but she is unsure if it affected her breathing.  She is currently on Dupixent for her eczema and asthma however, this has not helped with her nasal obstruction.  She does not currently smoke or drink Excessive, alcohol.  Denies all other issues    22: Here for post-op follow-up s/p FESSj/septoplasty on 22  1. Bilateral maxillary antrostomy with tissue removal  2. Bilateral total ethmoidectomy with tissue removal on the left  3. Bilateral sphenoidectomy  4. Bilateral frontal sinusotomy  5. Septoplasty  6. Tawana bullosa resection, left  6. CT stealth guidance use  Patient reports still very congested, still has some pain from the surgery. Doing rinses three times daily. No issues with bleeding      ROS: Reviewed   PAST MEDICAL HISTORY:  Past Medical History:   Diagnosis Date    Acne     Asthma       PAST SURGICAL HISTORY:  Past Surgical History:   Procedure Laterality Date     SECTION N/A     ENDOSCOPIC NASAL SEPTOPLASTY Bilateral 2022    Procedure: SEPTOPLASTY, NOSE, ENDOSCOPIC;  Surgeon: Fritz Martinez MD;  [facility]: [facility];  Service: ENT;  Laterality: Bilateral;    FUNCTIONAL ENDOSCOPIC SINUS SURGERY (FESS) USING COMPUTER-ASSISTED NAVIGATION Bilateral 2022    Procedure: FESS, USING COMPUTER-ASSISTED NAVIGATION;  Surgeon: Fritz Martinez MD;  Location: [facility] OR;  Service: ENT;  Laterality: " Bilateral;      SOCIAL HISTORY:  Social History     Socioeconomic History    Marital status: Single   Tobacco Use    Smoking status: Never Smoker    Smokeless tobacco: Never Used   Substance and Sexual Activity    Alcohol use: Never    Drug use: Never    Sexual activity: Yes      ALLERGIES:  Review of patient's allergies indicates:   Allergen Reactions    Iodine Shortness Of Breath and Swelling    Ketorolac Anaphylaxis and Itching    Meloxicam Hives and Swelling     Other reaction(s): Weal (disorder)      Methocarbamol Anaphylaxis    Tramadol Anaphylaxis    Trazodone Anaphylaxis and Palpitations    Peanut       FAMILY HISTORY:  Family History   Problem Relation Age of Onset    Allergies Mother     Hypertension Mother     Diabetes Father     Hypertension Father     Heart failure Father     Stroke Father     Clotting disorder Father     Seizures Father     Allergies Sister     Hypertension Sister       CURRENT MEDICATIONS:  Current Outpatient Medications on File Prior to Visit   Medication Sig Dispense Refill    albuterol (PROVENTIL) 2.5 mg /3 mL (0.083 %) nebulizer solution ONE VIAL EVERY FOUR HOUR AS NEEDED      albuterol sulfate (VENTOLIN HFA INHL) Ventolin HFA Take PRN No date recorded No form recorded No frequency recorded No route recorded No set duration recorded No set duration amount recorded active No dosage strength recorded No dosage strength units of measure recorded      budesonide-formoterol 160-4.5 mcg (SYMBICORT) 160-4.5 mcg/actuation HFAA Inhale 2 puffs into the lungs every 12 (twelve) hours. Controller      predniSONE (DELTASONE) 10 MG tablet Take 4 tablets (40 mg total) by mouth once daily for 2 days, THEN 2 tablets (20 mg total) once daily for 4 days, THEN 1 tablet (10 mg total) once daily for 3 days, THEN 0.5 tablets (5 mg total) once daily for 4 days. 21 tablet 0     No current facility-administered medications on file prior to visit.        PHYSICAL  EXAMINATION:  Vitals:    06/21/22 1445   BP: 104/69   Pulse: 78   Resp: 16   Temp: 97.7 °F (36.5 °C)      General: Well-developed well-nourished female in no acute distress.   Head and face: Normocephalic. No facial lesions.   Ears: External ears normal.  Nose: Nasal dorsum unremarkable. Magnetic splint sutured in place, splints removed, septum midline, generalized congestion of nasal cavity, full exam limited for which nasal endoscopy with performed (see below)  Oral cavity and oropharynx: Mucosa is moist. No lesions  Neck: Supple without palpable adenopathy or thyromegaly.   Eyes: Extraocular muscles are intact bilaterally. No exophthalmos or enophthalmos.  Neurologic: Alert and oriented x3. Cranial nerves II through XII are grossly normal.    Nasal endoscopy:   After written informed consent was obtained and nasal decongestion sprayed, Rigid zero degree scope was used to examine the bilateral nasal cavities. Septum midline, no evidence of perforations, generalized congestion/edema of the nasal cavity limiting full evaluation at this time. Patient tolerated procedure well without complications     LABS:    RADIOLOGY (Personally reviewed by me):      ASSESSMENT/PLAN:    Sampson Ly is a 31 y.o. 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  Patient has been on antihistamines, nasal steroids and Dupixent (dosed for asthma). Ct scan with a large polyp burden, right narvaez septal deviation and left sided bertin bullosa. Now s/p FESS, septoplasty, bertin bullosa resection. Splints removed in clinic today, some packing suctioned with endoscopy.       -- Continue saline irrigations, add budesonide to irrigations twice daily  -- NSI BID. Instructions provided  -- Sinus precautions for another week    RTC 2 - 3 weeks    Susanna Ball MD  [facility] of Otolaryngology  PGY IV

## 2022-06-22 NOTE — PROGRESS NOTES
I have reviewed the notes, assessments, and/or procedures performed this visit, and I concur with the documentation.    Fritz Martinez M.D.   Mirvaso Pregnancy And Lactation Text: This medication has not been assigned a Pregnancy Risk Category. It is unknown if the medication is excreted in breast milk.

## 2022-07-12 ENCOUNTER — OFFICE VISIT (OUTPATIENT)
Dept: OTOLARYNGOLOGY | Facility: CLINIC | Age: 32
End: 2022-07-12
Payer: MEDICAID

## 2022-07-12 VITALS
HEART RATE: 75 BPM | TEMPERATURE: 99 F | DIASTOLIC BLOOD PRESSURE: 77 MMHG | SYSTOLIC BLOOD PRESSURE: 113 MMHG | BODY MASS INDEX: 40.48 KG/M2 | WEIGHT: 243 LBS | HEIGHT: 65 IN | RESPIRATION RATE: 16 BRPM

## 2022-07-12 DIAGNOSIS — J33.9 NASAL POLYPOSIS: ICD-10-CM

## 2022-07-12 DIAGNOSIS — J32.4 CHRONIC PANSINUSITIS: ICD-10-CM

## 2022-07-12 DIAGNOSIS — J34.2 NASAL SEPTAL DEVIATION: Primary | ICD-10-CM

## 2022-07-12 PROCEDURE — 43200 ESOPHAGOSCOPY FLEXIBLE BRUSH: CPT | Mod: PBBFAC | Performed by: STUDENT IN AN ORGANIZED HEALTH CARE EDUCATION/TRAINING PROGRAM

## 2022-07-12 PROCEDURE — 99213 OFFICE O/P EST LOW 20 MIN: CPT | Mod: PBBFAC

## 2022-07-12 NOTE — PROGRESS NOTES
The scope used for the exam was:  Flexible scope ENF-P4  Serial Number:  1)    [id number]    []   2)    [id number]    []   3)    [id number]    []   4)    [id number]    []   5)    [id number]    []   6)    [id number]    []       The scope used for the exam was:  Rigid scope   Serial Number:  1)   [id number]    []   2)   [id number]    []   3)   7330    []   4)    3384   []   5)    0824   []   6)    5554   []     7)   7425    []   8)   2240    [x]   9)   1109    []

## 2022-07-12 NOTE — DISCHARGE SUMMARY
[facility] Surg Telemetry  Discharge Note  Short Stay    Procedure(s) (LRB):  FESS, USING COMPUTER-ASSISTED NAVIGATION (Bilateral)  SEPTOPLASTY, NOSE, ENDOSCOPIC (Bilateral)    OUTCOME: Patient tolerated treatment/procedure well without complication and is now ready for discharge.    DISPOSITION: Home or Self Care    FINAL DIAGNOSIS:  Nasal septal deviation    FOLLOWUP: In clinic    DISCHARGE INSTRUCTIONS:    Discharge Procedure Orders   Diet general     Keep surgical extremity elevated     Lifting restrictions     Activity as tolerated        TIME SPENT ON DISCHARGE:  Less than 30 minutes

## 2022-07-12 NOTE — PROGRESS NOTES
"[facility]   \[facility]\"" OTOLARYNGOLOGY H&P NOTE      CHIEF COMPLAINT:   No chief complaint on file.      HISTORY OF PRESENT ILLNESS:   March 29, 2022:  31-year-old female with a history of allergic rhinitis and nasal polyps referred by Dr. Eduardo Reno for evaluation of persistent nasal obstruction. Patient is indicated that she has had problems with allergic rhinitis since childhood and has been on allergy shots several times. She had restarted allergy shots 2 weeks ago after delivering her third baby.  She is indicated this has been a problem since childhood. She has perennial nasal congestion and postnasal drainage which is worse in the spring and during her pregnancies. Other symptoms include periodic stopped up sensation in her ears, itchy ears, sore throat and itchy throat, cough, diminished sense of smell and diminished sense of taste. Currently she has no sense of smell and very little taste. She had indicated that she has been on antibiotics nearly monthly for sinusitis. She had completed a course of prednisone 4 days ago when she was taking 20 mg daily for approximately 6days.  Currently she is on fluticasone nasal spray and levocetirizine. She had been on Astelin in the past which had provided significant relief. She had taken it in conjunction with fluticasone nasal spray she is also on saline nasal irrigations which are partially helpful. She has been on multiple other antihistamines.  Patient has been on Dupixent for eczema and she had noticed that it did help with her sinuses "some". She took for approximately 1 year and was taken off in May 2021 when she was found to be pregnant.  She also has a history of eczema and asthma. She also gives a history allergy to aspirin where she has an irregular heartbeat in her throat "closes up". She has taken ibuprofen in the past without incident.  She has never had any sinus surgery and has not had any CT scans of the sinuses.  She is a " "former smoker having quit in 2017. She smoked for approximately 6 years and was only a "social" smoker. She drinks alcohol occasionally and does not use illicit drugs. Review of Systems     2022:  Patient presenting in follow-up.  She could her Flonase.  She has not done any irrigations.  She continues to experience runny itchy eyes, runny nose, anosmia, nasal obstruction, facial pressure with intermittent worsening and chronic postnasal drip.  Patient notes that she has previously taken aspirin after which she had palpitations but she is unsure if it affected her breathing.  She is currently on Dupixent for her eczema and asthma however, this has not helped with her nasal obstruction.  She does not currently smoke or drink Excessive, alcohol.  Denies all other issues    22: Here for post-op follow-up s/p FESS/septoplasty on 22  1. Bilateral maxillary antrostomy with tissue removal  2. Bilateral total ethmoidectomy with tissue removal on the left  3. Bilateral sphenoidectomy  4. Bilateral frontal sinusotomy  5. Septoplasty  6. Tawana bullosa resection, left  6. CT stealth guidance use  Patient reports still very congested, still has some pain from the surgery. Doing rinses three times daily. No issues with bleeding    22: Here today for post op follow up. She is now about 1 month out. Continues to feel congestion and post nasal drip. Using sinus irrigations 2-3 times daily with budesonide capsule. Has restarted flonase and using twice daily. Has not had significant mucous or debris coming out with the irrigations.     ROS: Negative except as above     PAST MEDICAL HISTORY:  Past Medical History:   Diagnosis Date    Acne     Asthma       PAST SURGICAL HISTORY:  Past Surgical History:   Procedure Laterality Date     SECTION N/A     ENDOSCOPIC NASAL SEPTOPLASTY Bilateral 2022    Procedure: SEPTOPLASTY, NOSE, ENDOSCOPIC;  Surgeon: Fritz Martinez MD;  Location: [facility] OR;  " Service: ENT;  Laterality: Bilateral;    FUNCTIONAL ENDOSCOPIC SINUS SURGERY (FESS) USING COMPUTER-ASSISTED NAVIGATION Bilateral 6/13/2022    Procedure: FESS, USING COMPUTER-ASSISTED NAVIGATION;  Surgeon: Fritz Martinez MD;  Location: [facility];  Service: ENT;  Laterality: Bilateral;      SOCIAL HISTORY:  Social History     Socioeconomic History    Marital status: Single   Tobacco Use    Smoking status: Never Smoker    Smokeless tobacco: Never Used   Substance and Sexual Activity    Alcohol use: Never    Drug use: Never    Sexual activity: Yes      ALLERGIES:  Review of patient's allergies indicates:   Allergen Reactions    Iodine Shortness Of Breath and Swelling    Ketorolac Anaphylaxis and Itching    Meloxicam Hives and Swelling     Other reaction(s): Weal (disorder)      Methocarbamol Anaphylaxis    Tramadol Anaphylaxis    Trazodone Anaphylaxis and Palpitations    Peanut       FAMILY HISTORY:  Family History   Problem Relation Age of Onset    Allergies Mother     Hypertension Mother     Diabetes Father     Hypertension Father     Heart failure Father     Stroke Father     Clotting disorder Father     Seizures Father     Allergies Sister     Hypertension Sister       CURRENT MEDICATIONS:  Current Outpatient Medications on File Prior to Visit   Medication Sig Dispense Refill    albuterol (PROVENTIL) 2.5 mg /3 mL (0.083 %) nebulizer solution ONE VIAL EVERY FOUR HOUR AS NEEDED      albuterol sulfate (VENTOLIN HFA INHL) Ventolin HFA Take PRN No date recorded No form recorded No frequency recorded No route recorded No set duration recorded No set duration amount recorded active No dosage strength recorded No dosage strength units of measure recorded      budesonide-formoterol 160-4.5 mcg (SYMBICORT) 160-4.5 mcg/actuation HFAA Inhale 2 puffs into the lungs every 12 (twelve) hours. Controller       Current Facility-Administered Medications on File Prior to Visit   Medication Dose Route  Frequency Provider Last Rate Last Admin    LIDOcaine (PF) 40 mg/mL (4 %) injection 2 mL  2 mL Other 1 time in Clinic/HOD Susanna Ball MD        phenylephrine HCL 1% nasal spray 1 spray  1 spray Each Nostril 1 time in Clinic/HOD Susanna Ball MD            PHYSICAL EXAMINATION:  Vitals:    07/12/22 1118   BP: 113/77   Pulse: 75   Resp: 16   Temp: 98.6 °F (37 °C)      General: Well-developed well-nourished female in no acute distress.   Head and face: Normocephalic. No facial lesions.   Ears: External ears normal.  Nose: Nasal dorsum unremarkable, visible supratip crease. Septum midline. Minimal crusting removed from anterior to right inferior turbinate. See nasal endoscopy exam below  Oral cavity and oropharynx: Mucosa is moist. No lesions  Neck: Supple without palpable adenopathy or thyromegaly.   Eyes: Extraocular muscles are intact bilaterally. No exophthalmos or enophthalmos.  Neurologic: Alert and oriented x3. Cranial nerves II through XII are grossly normal.    Nasal endoscopy:   After written informed consent was obtained and nasal decongestion sprayed, Rigid zero degree scope was used to examine the bilateral nasal cavities. Septum midline, no evidence of perforations, generalized congestion/edema of the nasal cavity. No crusting or mucous appreciated. Patient tolerated procedure well without complications     LABS:    RADIOLOGY (Personally reviewed by me):      ASSESSMENT/PLAN:    Sampson Ly is a 31 y.o. 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  Patient has been on antihistamines, nasal steroids and Dupixent (dosed for asthma). Ct scan with a large polyp burden, right narvaez septal deviation and left sided bertin bullosa. Now s/p FESS, septoplasty, bertin bullosa resection 6/13/22.     - She has endorsed some irritation with the irrigations. Recommend continuing with nasal nasal regimen however  would back off of steroid usage. Can perform irrigation with added budesonide capsule in the morning. Then perform plain irrigation with saline packet followed by flonase spray at night.   - We discussed that swelling will continue to decrease and breathing should improve over time.   - RTC 1 week.     Symone Leon MD  [employer] Department of Otolaryngology  PGY V

## 2023-03-05 ENCOUNTER — OFFICE VISIT (OUTPATIENT)
Dept: URGENT CARE | Facility: CLINIC | Age: 33
End: 2023-03-05
Payer: MEDICAID

## 2023-03-05 VITALS
HEART RATE: 70 BPM | RESPIRATION RATE: 16 BRPM | BODY MASS INDEX: 36.73 KG/M2 | HEIGHT: 67 IN | DIASTOLIC BLOOD PRESSURE: 83 MMHG | TEMPERATURE: 97 F | WEIGHT: 234 LBS | OXYGEN SATURATION: 100 % | SYSTOLIC BLOOD PRESSURE: 129 MMHG

## 2023-03-05 DIAGNOSIS — K04.7 DENTAL INFECTION: Primary | ICD-10-CM

## 2023-03-05 PROCEDURE — 99214 PR OFFICE/OUTPT VISIT, EST, LEVL IV, 30-39 MIN: ICD-10-PCS | Mod: S$PBB,,, | Performed by: FAMILY MEDICINE

## 2023-03-05 PROCEDURE — 99214 OFFICE O/P EST MOD 30 MIN: CPT | Mod: PBBFAC | Performed by: FAMILY MEDICINE

## 2023-03-05 PROCEDURE — 99214 OFFICE O/P EST MOD 30 MIN: CPT | Mod: S$PBB,,, | Performed by: FAMILY MEDICINE

## 2023-03-05 RX ORDER — LIDOCAINE HYDROCHLORIDE 20 MG/ML
SOLUTION OROPHARYNGEAL
Qty: 20 ML | Refills: 0 | Status: SHIPPED | OUTPATIENT
Start: 2023-03-05

## 2023-03-05 RX ORDER — DOXYCYCLINE 100 MG/1
100 CAPSULE ORAL 2 TIMES DAILY
Qty: 20 CAPSULE | Refills: 0 | Status: SHIPPED | OUTPATIENT
Start: 2023-03-05 | End: 2023-03-15

## 2023-03-05 RX ORDER — CHLORHEXIDINE GLUCONATE ORAL RINSE 1.2 MG/ML
15 SOLUTION DENTAL 2 TIMES DAILY
Qty: 118 ML | Refills: 2 | Status: SHIPPED | OUTPATIENT
Start: 2023-03-05 | End: 2023-03-19

## 2023-03-05 RX ORDER — CRISABOROLE 20 MG/G
OINTMENT TOPICAL 2 TIMES DAILY
COMMUNITY
Start: 2023-01-17

## 2023-03-05 RX ORDER — FLUTICASONE PROPIONATE 50 MCG
SPRAY, SUSPENSION (ML) NASAL
COMMUNITY
Start: 2022-03-29

## 2023-03-05 NOTE — LETTER
March 5, 2023      [facility]  [address] [address]  Phone: [phone]       Patient: Sampson Ly   YOB: 1990  Date of Visit: 03/05/2023    To Whom It May Concern:    Meghana Ly  was at [facility] on 03/05/2023. The patient may return to work/school on 3/6/2023 with no restrictions. If you have any questions or concerns, or if I can be of further assistance, please do not hesitate to contact me.    Sincerely,    BRODY Powell MD

## 2023-03-05 NOTE — PROGRESS NOTES
"Subjective:       Patient ID: Sampson Ly is a 32 y.o. female.    Chief Complaint: Dental Pain (R side tooth pain , pt states filling fell out)      HPI  33 yo female with right upper rear tooth pain.  Filling fell out yesterday and pain started.  Second to last tooth in the back.  OTC medications minimally effective.   Review of Systems   HENT:          As above       Objective:       Vital Signs  Temp: 97.4 °F (36.3 °C)  Temp Source: Oral  Pulse: 70  Resp: 16  SpO2: 100 %  BP: 129/83  Height and Weight  Height: 5' 6.93" (170 cm)  Weight: 106.1 kg (234 lb)  BSA (Calculated - sq m): 2.24 sq meters  BMI (Calculated): 36.7  Weight in (lb) to have BMI = 25: 158.9]  Physical Exam  Constitutional:       Appearance: Normal appearance.   HENT:      Head: Normocephalic and atraumatic.      Mouth/Throat:      Comments: Tooth #2- filling absent, surrounding erythema and TTP  Eyes:      Extraocular Movements: Extraocular movements intact.      Conjunctiva/sclera: Conjunctivae normal.   Cardiovascular:      Rate and Rhythm: Normal rate and regular rhythm.      Heart sounds: Normal heart sounds.   Pulmonary:      Breath sounds: Normal breath sounds.   Skin:     General: Skin is warm and dry.   Neurological:      General: No focal deficit present.      Mental Status: She is alert.   Psychiatric:         Mood and Affect: Mood and affect normal.         Speech: Speech normal.         Behavior: Behavior normal. Behavior is cooperative.         Thought Content: Thought content does not include homicidal or suicidal ideation.       Assessment:       Problem List Items Addressed This Visit    None  Visit Diagnoses       Dental infection    -  Primary    Relevant Medications    doxycycline (MONODOX) 100 MG capsule    chlorhexidine (PERIDEX) 0.12 % solution    LIDOcaine HCl 2% (LIDOCAINE VISCOUS) 2 % Soln              Plan:    Encouraged ibuprofen/acetaminophen as needed  ER precautions  Follow-up with Dentist ASAP   "

## 2023-03-10 NOTE — PROGRESS NOTES
"[facility]   [facility] OTOLARYNGOLOGY H&P NOTE      CHIEF COMPLAINT:   No chief complaint on file.      HISTORY OF PRESENT ILLNESS:   March 29, 2022:  31-year-old female with a history of allergic rhinitis and nasal polyps referred by Dr. Eduardo Reno for evaluation of persistent nasal obstruction. Patient is indicated that she has had problems with allergic rhinitis since childhood and has been on allergy shots several times. She had restarted allergy shots 2 weeks ago after delivering her third baby.  She is indicated this has been a problem since childhood. She has perennial nasal congestion and postnasal drainage which is worse in the spring and during her pregnancies. Other symptoms include periodic stopped up sensation in her ears, itchy ears, sore throat and itchy throat, cough, diminished sense of smell and diminished sense of taste. Currently she has no sense of smell and very little taste. She had indicated that she has been on antibiotics nearly monthly for sinusitis. She had completed a course of prednisone 4 days ago when she was taking 20 mg daily for approximately 6days.  Currently she is on fluticasone nasal spray and levocetirizine. She had been on Astelin in the past which had provided significant relief. She had taken it in conjunction with fluticasone nasal spray she is also on saline nasal irrigations which are partially helpful. She has been on multiple other antihistamines.  Patient has been on Dupixent for eczema and she had noticed that it did help with her sinuses "some". She took for approximately 1 year and was taken off in May 2021 when she was found to be pregnant.  She also has a history of eczema and asthma. She also gives a history allergy to aspirin where she has an irregular heartbeat in her throat "closes up". She has taken ibuprofen in the past without incident.  She has never had any sinus surgery and has not had any CT scans of the sinuses.  She is a " "former smoker having quit in 2017. She smoked for approximately 6 years and was only a "social" smoker. She drinks alcohol occasionally and does not use illicit drugs. Review of Systems     6/8/2022:  Patient presenting in follow-up.  She could her Flonase.  She has not done any irrigations.  She continues to experience runny itchy eyes, runny nose, anosmia, nasal obstruction, facial pressure with intermittent worsening and chronic postnasal drip.  Patient notes that she has previously taken aspirin after which she had palpitations but she is unsure if it affected her breathing.  She is currently on Dupixent for her eczema and asthma however, this has not helped with her nasal obstruction.  She does not currently smoke or drink Excessive, alcohol.  Denies all other issues    6/21/22: Here for post-op follow-up s/p FESS/septoplasty on 6/13/22  1. Bilateral maxillary antrostomy with tissue removal  2. Bilateral total ethmoidectomy with tissue removal on the left  3. Bilateral sphenoidectomy  4. Bilateral frontal sinusotomy  5. Septoplasty  6. Tawana bullosa resection, left  6. CT stealth guidance use  Patient reports still very congested, still has some pain from the surgery. Doing rinses three times daily. No issues with bleeding    7/12/22: Here today for post op follow up. She is now about 1 month out. Continues to feel congestion and post nasal drip. Using sinus irrigations 2-3 times daily with budesonide capsule. Has restarted flonase and using twice daily. Has not had significant mucous or debris coming out with the irrigations.     3/13/23: Reports she has been having left epistaxis intermittently for the past few days. Endorses nasal congestion which has recently been worsening along with pressure to left nose. Recently completed amoxicillin for a tooth she is scheduled for extraction later this week and completed low dose prednisone taper for allergic rxn this morning. C/o continues hives and urticaria of " arms. She endorses anosmia since surgery and rarely tastes food. Still on Dupixent through Dr. Reno. She is using saline rinses TID with budesonide in the morning and flonase at night. Feels her asthma is fairly well controlled.     ROS: Negative except as above     PAST MEDICAL HISTORY:  Past Medical History:   Diagnosis Date    Acne     Asthma       PAST SURGICAL HISTORY:  Past Surgical History:   Procedure Laterality Date     SECTION N/A     ENDOSCOPIC NASAL SEPTOPLASTY Bilateral 2022    Procedure: SEPTOPLASTY, NOSE, ENDOSCOPIC;  Surgeon: Fritz Martinez MD;  Location: [facility];  Service: ENT;  Laterality: Bilateral;    FUNCTIONAL ENDOSCOPIC SINUS SURGERY (FESS) USING COMPUTER-ASSISTED NAVIGATION Bilateral 2022    Procedure: FESS, USING COMPUTER-ASSISTED NAVIGATION;  Surgeon: Fritz Martinez MD;  Location: [facility];  Service: ENT;  Laterality: Bilateral;      SOCIAL HISTORY:  Social History     Socioeconomic History    Marital status: Single   Tobacco Use    Smoking status: Never Smoker    Smokeless tobacco: Never Used   Substance and Sexual Activity    Alcohol use: Never    Drug use: Never    Sexual activity: Yes      ALLERGIES:  Review of patient's allergies indicates:   Allergen Reactions    Iodine Shortness Of Breath and Swelling    Ketorolac Anaphylaxis and Itching    Meloxicam Hives and Swelling     Other reaction(s): Weal (disorder)      Methocarbamol Anaphylaxis    Tramadol Anaphylaxis    Trazodone Anaphylaxis and Palpitations    Peanut       FAMILY HISTORY:  Family History   Problem Relation Age of Onset    Allergies Mother     Hypertension Mother     Diabetes Father     Hypertension Father     Heart failure Father     Stroke Father     Clotting disorder Father     Seizures Father     Allergies Sister     Hypertension Sister       CURRENT MEDICATIONS:  Current Outpatient Medications on File Prior to Visit   Medication Sig Dispense Refill    albuterol (PROVENTIL) 2.5 mg /3 mL  (0.083 %) nebulizer solution ONE VIAL EVERY FOUR HOUR AS NEEDED      albuterol sulfate (VENTOLIN HFA INHL) Ventolin HFA Take PRN No date recorded No form recorded No frequency recorded No route recorded No set duration recorded No set duration amount recorded active No dosage strength recorded No dosage strength units of measure recorded      budesonide-formoterol 160-4.5 mcg (SYMBICORT) 160-4.5 mcg/actuation HFAA Inhale 2 puffs into the lungs every 12 (twelve) hours. Controller       Current Facility-Administered Medications on File Prior to Visit   Medication Dose Route Frequency Provider Last Rate Last Admin    LIDOcaine (PF) 40 mg/mL (4 %) injection 2 mL  2 mL Other 1 time in Clinic/HOD Susanna Ball MD        phenylephrine HCL 1% nasal spray 1 spray  1 spray Each Nostril 1 time in Clinic/HOD Susanna Ball MD            PHYSICAL EXAMINATION:  Vitals:    07/12/22 1118   BP: 113/77   Pulse: 75   Resp: 16   Temp: 98.6 °F (37 °C)      General: Well-developed well-nourished female in no acute distress.   Head and face: Normocephalic. No facial lesions.   Ears: External ears normal.  Nose: Nasal dorsum unremarkable, visible supratip crease. Septum midline. There is bloody crusting on the left, mucoid drainage on the right. Significant swelling. *See nasal endoscopy exam below  Oral cavity and oropharynx: Mucosa is moist. No lesions  Neck: Supple without palpable adenopathy or thyromegaly.   Eyes: Extraocular muscles are intact bilaterally. No exophthalmos or enophthalmos.  Neurologic: Alert and oriented x3. Cranial nerves II through XII are grossly normal.    Nasal endoscopy with Dr. Gomez:   After verbal consent was obtained and nasal decongestion + anesthetic sprayed, Rigid zero degree scope was used to examine the bilateral nasal cavities. Septum midline, no evidence of perforations, generalized congestion/edema with possible polypoid changes (difficult to visualize 2/2 copious mucoid secretions). No  active bleeding or superficial vessles noted on left. Patient tolerated procedure well without complications     LABS:    RADIOLOGY (Personally reviewed by me):      ASSESSMENT/PLAN:    Sampson Ly is a 31 y.o. 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  Patient has been on antihistamines, nasal steroids and Dupixent (dosed for asthma). S/p FESS, septoplasty, bertin bullosa resection 6/13/22. Marked edema and likely polyposis on nasal endoscopy today. Will increase steroid taper & provide augmentin Rx in case she develops sings of infection prior to f/u. D/w Dr. Gomez.    - Continue NSI TID with budesonide in am & flonase Q pm  - High dose prednisone taper. May need to continue steroids QOD beyond this  - Augmentin rx if needed  - RTC 2 weeks week.     Nicky Fuentes NP

## 2023-03-13 ENCOUNTER — OFFICE VISIT (OUTPATIENT)
Dept: OTOLARYNGOLOGY | Facility: CLINIC | Age: 33
End: 2023-03-13
Payer: MEDICAID

## 2023-03-13 VITALS
DIASTOLIC BLOOD PRESSURE: 73 MMHG | WEIGHT: 241.63 LBS | TEMPERATURE: 98 F | HEART RATE: 75 BPM | BODY MASS INDEX: 37.92 KG/M2 | SYSTOLIC BLOOD PRESSURE: 109 MMHG | HEIGHT: 67 IN

## 2023-03-13 DIAGNOSIS — R43.0 ANOSMIA: ICD-10-CM

## 2023-03-13 DIAGNOSIS — R09.81 NASAL CONGESTION: ICD-10-CM

## 2023-03-13 DIAGNOSIS — J33.9 NASAL POLYPOSIS: Primary | ICD-10-CM

## 2023-03-13 DIAGNOSIS — J32.9 CHRONIC RHINOSINUSITIS: ICD-10-CM

## 2023-03-13 PROCEDURE — 1159F PR MEDICATION LIST DOCUMENTED IN MEDICAL RECORD: ICD-10-PCS | Mod: CPTII,,, | Performed by: NURSE PRACTITIONER

## 2023-03-13 PROCEDURE — 1159F MED LIST DOCD IN RCRD: CPT | Mod: CPTII,,, | Performed by: NURSE PRACTITIONER

## 2023-03-13 PROCEDURE — 3074F PR MOST RECENT SYSTOLIC BLOOD PRESSURE < 130 MM HG: ICD-10-PCS | Mod: CPTII,,, | Performed by: NURSE PRACTITIONER

## 2023-03-13 PROCEDURE — 3074F SYST BP LT 130 MM HG: CPT | Mod: CPTII,,, | Performed by: NURSE PRACTITIONER

## 2023-03-13 PROCEDURE — 3078F PR MOST RECENT DIASTOLIC BLOOD PRESSURE < 80 MM HG: ICD-10-PCS | Mod: CPTII,,, | Performed by: NURSE PRACTITIONER

## 2023-03-13 PROCEDURE — 99214 OFFICE O/P EST MOD 30 MIN: CPT | Mod: S$PBB,25,, | Performed by: NURSE PRACTITIONER

## 2023-03-13 PROCEDURE — 3008F PR BODY MASS INDEX (BMI) DOCUMENTED: ICD-10-PCS | Mod: CPTII,,, | Performed by: NURSE PRACTITIONER

## 2023-03-13 PROCEDURE — 31231 NASAL ENDOSCOPY DX: CPT | Mod: S$PBB,,, | Performed by: NURSE PRACTITIONER

## 2023-03-13 PROCEDURE — 3008F BODY MASS INDEX DOCD: CPT | Mod: CPTII,,, | Performed by: NURSE PRACTITIONER

## 2023-03-13 PROCEDURE — 3078F DIAST BP <80 MM HG: CPT | Mod: CPTII,,, | Performed by: NURSE PRACTITIONER

## 2023-03-13 PROCEDURE — 31231 PR NASAL ENDOSCOPY, DX: ICD-10-PCS | Mod: S$PBB,,, | Performed by: NURSE PRACTITIONER

## 2023-03-13 PROCEDURE — 31231 NASAL ENDOSCOPY DX: CPT | Mod: PBBFAC | Performed by: NURSE PRACTITIONER

## 2023-03-13 PROCEDURE — 99214 OFFICE O/P EST MOD 30 MIN: CPT | Mod: PBBFAC | Performed by: NURSE PRACTITIONER

## 2023-03-13 PROCEDURE — 99214 PR OFFICE/OUTPT VISIT, EST, LEVL IV, 30-39 MIN: ICD-10-PCS | Mod: S$PBB,25,, | Performed by: NURSE PRACTITIONER

## 2023-03-13 RX ORDER — LIDOCAINE HYDROCHLORIDE 40 MG/ML
1 INJECTION, SOLUTION RETROBULBAR ONCE
Status: ACTIVE | OUTPATIENT
Start: 2023-03-13

## 2023-03-13 RX ORDER — PREDNISONE 20 MG/1
20 TABLET ORAL SEE ADMIN INSTRUCTIONS
Qty: 21 TABLET | Refills: 0 | Status: SHIPPED | OUTPATIENT
Start: 2023-03-13 | End: 2023-03-27

## 2023-03-13 RX ORDER — AMOXICILLIN AND CLAVULANATE POTASSIUM 875; 125 MG/1; MG/1
1 TABLET, FILM COATED ORAL EVERY 12 HOURS
Qty: 20 TABLET | Refills: 0 | Status: SHIPPED | OUTPATIENT
Start: 2023-03-13 | End: 2023-03-23

## 2023-03-13 NOTE — PROGRESS NOTES
The scope used for the exam was:  Flexible scope ENF-P4  Serial Number:  1)    [id number]    []   2)    [id number]    []   3)    [id number]    []   4)    [id number]    []   5)    [id number]    []   6)    [id number]    []       The scope used for the exam was:  Rigid scope   Serial Number:  1)   [id number]    []   2)   [id number]    [x]   3)   7330    []   4)    3384   []   5)    0824   []   6)    5554   []     7)   7425    []   8)   2240    []   9)   1109    []

## 2023-04-04 ENCOUNTER — PATIENT MESSAGE (OUTPATIENT)
Dept: RESEARCH | Facility: HOSPITAL | Age: 33
End: 2023-04-04
Payer: MEDICAID

## 2023-04-13 ENCOUNTER — DOCUMENTATION ONLY (OUTPATIENT)
Dept: ADMINISTRATIVE | Facility: HOSPITAL | Age: 33
End: 2023-04-13
Payer: MEDICAID

## 2024-07-23 ENCOUNTER — OFFICE VISIT (OUTPATIENT)
Dept: URGENT CARE | Facility: CLINIC | Age: 34
End: 2024-07-23
Payer: MEDICAID

## 2024-07-23 VITALS
HEART RATE: 60 BPM | OXYGEN SATURATION: 100 % | TEMPERATURE: 98 F | BODY MASS INDEX: 37.59 KG/M2 | HEIGHT: 66 IN | DIASTOLIC BLOOD PRESSURE: 83 MMHG | SYSTOLIC BLOOD PRESSURE: 123 MMHG | RESPIRATION RATE: 18 BRPM | WEIGHT: 233.88 LBS

## 2024-07-23 DIAGNOSIS — R09.89 SYMPTOMS OF UPPER RESPIRATORY INFECTION (URI): ICD-10-CM

## 2024-07-23 DIAGNOSIS — J02.0 STREP PHARYNGITIS: Primary | ICD-10-CM

## 2024-07-23 LAB
CTP QC/QA: YES
CTP QC/QA: YES
MOLECULAR STREP A: POSITIVE
SARS-COV-2 RDRP RESP QL NAA+PROBE: NEGATIVE

## 2024-07-23 PROCEDURE — 99215 OFFICE O/P EST HI 40 MIN: CPT | Mod: PBBFAC

## 2024-07-23 PROCEDURE — 87635 SARS-COV-2 COVID-19 AMP PRB: CPT | Mod: PBBFAC

## 2024-07-23 PROCEDURE — 99213 OFFICE O/P EST LOW 20 MIN: CPT | Mod: S$PBB,,,

## 2024-07-23 PROCEDURE — 87651 STREP A DNA AMP PROBE: CPT | Mod: PBBFAC

## 2024-07-23 RX ORDER — CHLORHEXIDINE GLUCONATE 40 MG/ML
SOLUTION TOPICAL
COMMUNITY

## 2024-07-23 RX ORDER — AMOXICILLIN 875 MG/1
875 TABLET, FILM COATED ORAL EVERY 12 HOURS
Qty: 20 TABLET | Refills: 0 | Status: SHIPPED | OUTPATIENT
Start: 2024-07-23 | End: 2024-08-02

## 2024-07-23 RX ORDER — DUPILUMAB 300 MG/2ML
300 INJECTION, SOLUTION SUBCUTANEOUS
COMMUNITY

## 2024-07-23 RX ORDER — BUTALBITAL, ACETAMINOPHEN AND CAFFEINE 50; 325; 40 MG/1; MG/1; MG/1
1 TABLET ORAL EVERY 4 HOURS PRN
COMMUNITY
Start: 2024-06-27

## 2024-07-23 RX ORDER — CETIRIZINE HYDROCHLORIDE 10 MG/1
10 TABLET ORAL
COMMUNITY

## 2024-07-23 RX ORDER — BUDESONIDE 1 MG/2ML
INHALANT ORAL
COMMUNITY
Start: 2024-06-24

## 2024-07-23 RX ORDER — LEVOCETIRIZINE DIHYDROCHLORIDE 5 MG/1
5 TABLET, FILM COATED ORAL
COMMUNITY

## 2024-07-23 RX ORDER — HYDROXYZINE PAMOATE 25 MG/1
25 CAPSULE ORAL
COMMUNITY

## 2024-07-23 NOTE — PATIENT INSTRUCTIONS
Take medications as directed.  Discussed contagious precautions.      Please encourage fluids and use over-the-counter medications for symptoms as needed.  Monitor closely.  Please follow instructions on patient education material.  Return to urgent care in 2-3 days if symptoms are not improving. Seek care immediately if new or worsening symptoms develop.

## 2024-07-23 NOTE — LETTER
July 23, 2024      [facility]  [facility] [facility] [address]  Phone: [phone]       Patient: Sampson Ly   YOB: 1990  Date of Visit: 07/23/2024    To Whom It May Concern:    Meghana Ly  was at [facility] on 07/23/2024. The patient may return to work/school on 07/25/2024 with no restrictions. If you have any questions or concerns, or if I can be of further assistance, please do not hesitate to contact me.    Sincerely,    SOLO Diaz

## 2024-07-23 NOTE — PROGRESS NOTES
"Subjective:       Patient ID: Sampson Ly is a 34 y.o. female.    Vitals:  height is 5' 6" (1.676 m) and weight is 106.1 kg (233 lb 14.4 oz). Her temperature is 98.1 °F (36.7 °C). Her blood pressure is 123/83 and her pulse is 60. Her respiration is 18 and oxygen saturation is 100%.     Chief Complaint: URI (Sore throat, congestion since this morning. )    34-year-old female presents to the clinic with complaints of sore throat and congestion that began this morning.  Patient has a 2-year-old son and reports that son was seen in the clinic on Saturday and tested positive for strep throat.  Patient denies cough and just reports severe throat pain that is a 6/10 on pain scale.  Patient states she has not had a fever yet, but has been taking over-the-counter ibuprofen for the pain.  Discussed positive strep status and treatment plan with patient and she verbalizes understanding.  We will provide to date excuse for patient and extension of excuse for son as she reports that her son is having fever and rash still and can not return to .    All other systems are negative    Chart reviewed    Objective:   Physical Exam   Constitutional: She appears well-developed.  Non-toxic appearance. She does not appear ill. No distress.   HENT:   Head: Normocephalic and atraumatic.   Ears:   Right Ear: Hearing, tympanic membrane, external ear and ear canal normal.   Left Ear: Hearing, tympanic membrane, external ear and ear canal normal.   Nose: Mucosal edema and rhinorrhea present. No purulent discharge. Right sinus exhibits no maxillary sinus tenderness and no frontal sinus tenderness. Left sinus exhibits no maxillary sinus tenderness and no frontal sinus tenderness.   Mouth/Throat: Uvula is midline and mucous membranes are normal. Oropharyngeal exudate, posterior oropharyngeal edema and posterior oropharyngeal erythema present.   Eyes: Right eye exhibits no discharge. Left eye exhibits no discharge. Extraocular " movement intact   Neck: Neck supple. No neck rigidity present.   Cardiovascular: Regular rhythm and normal heart sounds.   Pulmonary/Chest: Effort normal and breath sounds normal. No respiratory distress. She has no wheezes. She has no rales.   Abdominal: Bowel sounds are normal.   Lymphadenopathy:     She has no cervical adenopathy.   Neurological: She is alert.   Skin: Skin is warm, dry and not diaphoretic.   Psychiatric: Her behavior is normal.   Nursing note and vitals reviewed.        Assessment:     1. Strep pharyngitis    2. Symptoms of upper respiratory infection (URI)          Results for orders placed or performed in visit on 07/23/24   POCT COVID-19 Rapid Screening   Result Value Ref Range    POC Rapid COVID Negative Negative     Acceptable Yes    POCT Strep A, Molecular   Result Value Ref Range    Molecular Strep A, POC Positive (A) Negative     Acceptable Yes        Plan:     Take medications as directed.  Discussed contagious precautions.      Please encourage fluids and use over-the-counter medications for symptoms as needed.  Monitor closely.  Please follow instructions on patient education material.  Return to urgent care in 2-3 days if symptoms are not improving. Seek care immediately if new or worsening symptoms develop.     Strep pharyngitis  -     amoxicillin (AMOXIL) 875 MG tablet; Take 1 tablet (875 mg total) by mouth every 12 (twelve) hours. for 10 days  Dispense: 20 tablet; Refill: 0    Symptoms of upper respiratory infection (URI)  -     POCT COVID-19 Rapid Screening  -     POCT Strep A, Molecular        Please note: This chart was completed via voice to text dictation. It may contain typographical/word recognition errors. If there are any questions, please contact the provider for final clarification.

## 2025-01-15 ENCOUNTER — OFFICE VISIT (OUTPATIENT)
Dept: URGENT CARE | Facility: CLINIC | Age: 35
End: 2025-01-15
Payer: MEDICAID

## 2025-01-15 VITALS
WEIGHT: 228.63 LBS | TEMPERATURE: 98 F | SYSTOLIC BLOOD PRESSURE: 127 MMHG | DIASTOLIC BLOOD PRESSURE: 85 MMHG | BODY MASS INDEX: 36.74 KG/M2 | HEART RATE: 73 BPM | OXYGEN SATURATION: 100 % | RESPIRATION RATE: 18 BRPM | HEIGHT: 66 IN

## 2025-01-15 DIAGNOSIS — J02.0 STREP PHARYNGITIS: Primary | ICD-10-CM

## 2025-01-15 DIAGNOSIS — J06.9 UPPER RESPIRATORY TRACT INFECTION, UNSPECIFIED TYPE: ICD-10-CM

## 2025-01-15 LAB
CTP QC/QA: YES
MOLECULAR STREP A: POSITIVE
POC MOLECULAR INFLUENZA A AGN: NEGATIVE
POC MOLECULAR INFLUENZA B AGN: NEGATIVE
SARS-COV-2 AG RESP QL IA.RAPID: NEGATIVE

## 2025-01-15 PROCEDURE — 99215 OFFICE O/P EST HI 40 MIN: CPT | Mod: PBBFAC

## 2025-01-15 PROCEDURE — 87651 STREP A DNA AMP PROBE: CPT | Mod: PBBFAC

## 2025-01-15 PROCEDURE — 87811 SARS-COV-2 COVID19 W/OPTIC: CPT | Mod: PBBFAC

## 2025-01-15 PROCEDURE — 87502 INFLUENZA DNA AMP PROBE: CPT | Mod: PBBFAC

## 2025-01-15 PROCEDURE — 99214 OFFICE O/P EST MOD 30 MIN: CPT | Mod: S$PBB,,,

## 2025-01-15 RX ORDER — PROMETHAZINE HYDROCHLORIDE AND DEXTROMETHORPHAN HYDROBROMIDE 6.25; 15 MG/5ML; MG/5ML
5 SYRUP ORAL EVERY 4 HOURS PRN
Qty: 118 ML | Refills: 0 | Status: SHIPPED | OUTPATIENT
Start: 2025-01-15 | End: 2025-01-25

## 2025-01-15 RX ORDER — AMOXICILLIN 875 MG/1
875 TABLET, FILM COATED ORAL EVERY 12 HOURS
Qty: 20 TABLET | Refills: 0 | Status: SHIPPED | OUTPATIENT
Start: 2025-01-15 | End: 2025-01-25

## 2025-01-15 NOTE — PROGRESS NOTES
"Subjective:       Patient ID: Sampson Ly is a 34 y.o. female.    Vitals:  height is 5' 6" (1.676 m) and weight is 103.7 kg (228 lb 9.6 oz). Her temperature is 97.9 °F (36.6 °C). Her blood pressure is 127/85 and her pulse is 73. Her respiration is 18 and oxygen saturation is 100%.     Chief Complaint: URI (Sore throat, wheezing, nasal congestion x yesterday/Patient states that she do have asthma.)    34-year-old female reports to the clinic with complaints of sore throat, nasal congestion, and wheezing that began yesterday.  Patient states that her daughter school called her to pick her up because her daughter was ill and then a few hours later patient began to feel ill as well.  Patient states that she does have asthma and is currently seeing an ENT doctor for her asthma.  Patient denies fever, nausea, vomiting, abdominal pain, headache, fatigue, chills, body aches.    All other systems are negative    Chart reviewed    Objective:   Physical Exam   Constitutional: She appears well-developed.  Non-toxic appearance. She does not appear ill. No distress.   HENT:   Head: Normocephalic and atraumatic.   Ears:   Right Ear: Hearing, tympanic membrane, external ear and ear canal normal.   Left Ear: Hearing, tympanic membrane, external ear and ear canal normal.   Nose: Mucosal edema and rhinorrhea present. No purulent discharge. Right sinus exhibits no maxillary sinus tenderness and no frontal sinus tenderness. Left sinus exhibits no maxillary sinus tenderness and no frontal sinus tenderness.   Mouth/Throat: Uvula is midline. Oropharyngeal exudate, posterior oropharyngeal edema and posterior oropharyngeal erythema present.   Eyes: Right eye exhibits no discharge. Left eye exhibits no discharge. Extraocular movement intact   Neck: Neck supple. No neck rigidity present.   Cardiovascular: Regular rhythm.   Pulmonary/Chest: Effort normal and breath sounds normal. No respiratory distress. She has no wheezes. She has no " rhonchi. She has no rales.   Lymphadenopathy:     She has no cervical adenopathy.   Neurological: She is alert.   Skin: Skin is warm, dry and not diaphoretic.   Psychiatric: Her behavior is normal.   Nursing note and vitals reviewed.      Assessment:     1. Strep pharyngitis    2. Upper respiratory tract infection, unspecified type        Results for orders placed or performed in visit on 01/15/25   POCT Strep A, Molecular    Collection Time: 01/15/25 10:21 AM   Result Value Ref Range    Molecular Strep A, POC Positive (A) Negative     Acceptable Yes    SARS Coronavirus 2 Antigen, POCT Manual Read    Collection Time: 01/15/25 10:27 AM   Result Value Ref Range    SARS Coronavirus 2 Antigen Negative Negative     Acceptable Yes    POCT Influenza A/B Molecular    Collection Time: 01/15/25 10:30 AM   Result Value Ref Range    POC Molecular Influenza A Ag Negative Negative    POC Molecular Influenza B Ag Negative Negative     Acceptable Yes          Plan:     Take medications as directed.  Discussed contagious precautions.      Please encourage fluids and use over-the-counter medications for symptoms as needed.  Monitor closely.  Please follow instructions on patient education material.  Return to urgent care in 2-3 days if symptoms are not improving. Seek care immediately if new or worsening symptoms develop.      Strep pharyngitis  -     amoxicillin (AMOXIL) 875 MG tablet; Take 1 tablet (875 mg total) by mouth every 12 (twelve) hours. for 10 days  Dispense: 20 tablet; Refill: 0  -     promethazine-dextromethorphan (PROMETHAZINE-DM) 6.25-15 mg/5 mL Syrp; Take 5 mLs by mouth every 4 (four) hours as needed (cough).  Dispense: 118 mL; Refill: 0    Upper respiratory tract infection, unspecified type  -     POCT Influenza A/B Molecular  -     POCT Strep A, Molecular  -     SARS Coronavirus 2 Antigen, POCT Manual Read        Please note: This chart was completed via voice to text  dictation. It may contain typographical/word recognition errors. If there are any questions, please contact the provider for final clarification.

## 2025-05-03 ENCOUNTER — HOSPITAL ENCOUNTER (EMERGENCY)
Facility: HOSPITAL | Age: 35
Discharge: HOME | End: 2025-05-03
Attending: EMERGENCY MEDICINE
Payer: MEDICAID

## 2025-05-03 VITALS
HEART RATE: 67 BPM | SYSTOLIC BLOOD PRESSURE: 141 MMHG | HEIGHT: 65 IN | WEIGHT: 230 LBS | OXYGEN SATURATION: 96 % | BODY MASS INDEX: 38.32 KG/M2 | TEMPERATURE: 98 F | RESPIRATION RATE: 19 BRPM | DIASTOLIC BLOOD PRESSURE: 107 MMHG

## 2025-05-03 DIAGNOSIS — R51.9 NONINTRACTABLE HEADACHE, UNSPECIFIED CHRONICITY PATTERN, UNSPECIFIED HEADACHE TYPE: Primary | ICD-10-CM

## 2025-05-03 LAB — B-HCG UR QL: NEGATIVE

## 2025-05-03 PROCEDURE — 81025 URINE PREGNANCY TEST: CPT | Performed by: EMERGENCY MEDICINE

## 2025-05-03 PROCEDURE — 63600175 PHARM REV CODE 636 W HCPCS: Performed by: EMERGENCY MEDICINE

## 2025-05-03 PROCEDURE — 96375 TX/PRO/DX INJ NEW DRUG ADDON: CPT

## 2025-05-03 PROCEDURE — 96374 THER/PROPH/DIAG INJ IV PUSH: CPT

## 2025-05-03 PROCEDURE — 99284 EMERGENCY DEPT VISIT MOD MDM: CPT | Mod: 25

## 2025-05-03 RX ORDER — DIPHENHYDRAMINE HYDROCHLORIDE 50 MG/ML
25 INJECTION, SOLUTION INTRAMUSCULAR; INTRAVENOUS
Status: COMPLETED | OUTPATIENT
Start: 2025-05-03 | End: 2025-05-03

## 2025-05-03 RX ORDER — PROCHLORPERAZINE EDISYLATE 5 MG/ML
5 INJECTION INTRAMUSCULAR; INTRAVENOUS
Status: COMPLETED | OUTPATIENT
Start: 2025-05-03 | End: 2025-05-03

## 2025-05-03 RX ORDER — BUTALBITAL, ACETAMINOPHEN AND CAFFEINE 50; 325; 40 MG/1; MG/1; MG/1
1 TABLET ORAL EVERY 6 HOURS PRN
Qty: 20 TABLET | Refills: 0 | Status: SHIPPED | OUTPATIENT
Start: 2025-05-03 | End: 2025-06-02

## 2025-05-03 RX ADMIN — DIPHENHYDRAMINE HYDROCHLORIDE 25 MG: 50 INJECTION INTRAMUSCULAR; INTRAVENOUS at 11:05

## 2025-05-03 RX ADMIN — PROCHLORPERAZINE EDISYLATE 5 MG: 5 INJECTION INTRAMUSCULAR; INTRAVENOUS at 11:05

## 2025-05-03 NOTE — ED PROVIDER NOTES
Encounter Date: 5/3/2025       History     Chief Complaint   Patient presents with    Headache     Frontal headaches x 1.5 weeks unrelieved by all OTC efforts. Reports nausea with headache. +Photophobia. GCS 15     34-year-old female history of migraines, asthma presents with frontal headache described as pounding for 1.5 weeks patient says lights make her headache worse and she has had similar headaches.  She has been taking over-the-counter medicines which are not helping.  No vision change, neck pain, fever, neuro symptoms, congestion.  +nausea but no vomiting.  Patient last took Tylenol at 7:00 a.m.    The history is provided by the patient.     Review of patient's allergies indicates:   Allergen Reactions    Iodine Shortness Of Breath and Swelling    Ketorolac Anaphylaxis and Itching    Meloxicam Hives and Swelling     Other reaction(s): Weal (disorder)      Methocarbamol Anaphylaxis    Tramadol Anaphylaxis    Trazodone Anaphylaxis and Palpitations    Nsaids (non-steroidal anti-inflammatory drug) Hives    Peanut     Adhesive Rash    Latex Rash     Past Medical History:   Diagnosis Date    Acne     Asthma      Past Surgical History:   Procedure Laterality Date     SECTION N/A     ENDOSCOPIC NASAL SEPTOPLASTY Bilateral 2022    Procedure: SEPTOPLASTY, NOSE, ENDOSCOPIC;  Surgeon: Fritz Martinez MD;  Location: [facility];  Service: ENT;  Laterality: Bilateral;    FUNCTIONAL ENDOSCOPIC SINUS SURGERY (FESS) USING COMPUTER-ASSISTED NAVIGATION Bilateral 2022    Procedure: FESS, USING COMPUTER-ASSISTED NAVIGATION;  Surgeon: Fritz Martinez MD;  Location: [facility];  Service: ENT;  Laterality: Bilateral;     Family History   Problem Relation Name Age of Onset    Allergies Mother      Hypertension Mother      Diabetes Father      Hypertension Father      Heart failure Father      Stroke Father      Clotting disorder Father      Seizures Father      Allergies Sister      Hypertension Sister        Social History[1]  Review of Systems   Constitutional:  Negative for chills, fatigue and unexpected weight change.   HENT:  Negative for congestion, rhinorrhea, sinus pain and sore throat.    Eyes:  Positive for photophobia. Negative for discharge.   Respiratory:  Positive for cough. Negative for chest tightness, shortness of breath and wheezing.    Cardiovascular:  Negative for chest pain, palpitations and leg swelling.   Gastrointestinal:  Positive for nausea. Negative for abdominal distention, abdominal pain, constipation, diarrhea and vomiting.   Genitourinary:  Negative for dysuria, frequency, hematuria, menstrual problem, pelvic pain, vaginal bleeding, vaginal discharge and vaginal pain.   Musculoskeletal:  Negative for back pain and neck pain.   Skin:  Negative for color change and rash.   Neurological:  Positive for headaches. Negative for weakness and numbness.   Psychiatric/Behavioral:  Negative for suicidal ideas.        Physical Exam     Initial Vitals [05/03/25 0948]   BP Pulse Resp Temp SpO2   (!) 141/107 66 19 97.8 °F (36.6 °C) 100 %      MAP       --         Physical Exam    Nursing note and vitals reviewed.  Constitutional: No distress.   HENT:   Head: Atraumatic.   Eyes: Conjunctivae are normal. Pupils are equal, round, and reactive to light.   Neck: Neck supple.   Normal range of motion.  Cardiovascular:  Normal rate.           Pulmonary/Chest: No respiratory distress.   Musculoskeletal:         General: Normal range of motion.      Cervical back: Normal range of motion and neck supple.     Neurological: She is alert and oriented to person, place, and time. No cranial nerve deficit or sensory deficit. GCS score is 15. GCS eye subscore is 4. GCS verbal subscore is 5. GCS motor subscore is 6.         ED Course   Procedures  Labs Reviewed   PREGNANCY TEST, URINE RAPID - Normal       Result Value    hCG Qualitative, Urine Negative            Imaging Results    None          Medications    prochlorperazine injection Soln 5 mg (5 mg Intravenous Given 5/3/25 1119)   diphenhydrAMINE injection 25 mg (25 mg Intravenous Given 5/3/25 1119)     Medical Decision Making  Medical Decision Making  Problem list/ differential diagnosis including but not limited to:  Migraine, tension, cluster, meningitis/encephalitis, ich/sah, hydrocephalus, mass, cva, co poisoning, preeclampsia, carotid/ basilar artery dissection, cerebral venous thrombosis, temporal arteritis, glaucoma, sinusitis, cavernous sinus thrombosis, tmj, post-LP, viral illness    Patient's chronic illnesses impacting care:  none    Diagnostic test considered but not ordered:    My interpretations:      Radiology reports      Discussion of case with external qualified healthcare professionals:  Not applicable    Review of external notes( inpt, ems, NH, clinic):      Decision rules/scores:    Medications reviewed:  Medications ordered in the ER:  Compazine and Benadryl  Discharge prescriptions:  Fioricet    Social variables possible impacting patient's healthcare:    Code status/discussion    Shared decision making:    Consideration for admission versus discharge: stable for discharge     Amount and/or Complexity of Data Reviewed  Labs:  Decision-making details documented in ED Course.    Risk  Prescription drug management.               ED Course as of 05/03/25 1152   Sat May 03, 2025   1143 Patient's headache feels better.  She is requesting discharge [WC]   1147 hCG Qualitative, Urine: Negative [WC]      ED Course User Index  [WC] Bud Bolden MD                           Clinical Impression:  Final diagnoses:  [R51.9] Nonintractable headache, unspecified chronicity pattern, unspecified headache type (Primary)          ED Disposition Condition    Discharge Stable          ED Prescriptions       Medication Sig Dispense Start Date End Date Auth. Provider    butalbital-acetaminophen-caffeine -40 mg (FIORICET, ESGIC) -40 mg per tablet  Take 1 tablet by mouth every 6 (six) hours as needed for Headaches. 20 tablet 5/3/2025 2025 Bud Bolden MD          Follow-up Information    None          Bud Bolden MD  25 1147         [1]   Social History  Tobacco Use    Smoking status: Former     Current packs/day: 0.00     Types: Cigarettes     Quit date: 2021     Years since quittin.1     Passive exposure: Never    Smokeless tobacco: Never   Substance Use Topics    Alcohol use: Never    Drug use: Never        Bud Bolden MD  25 1153

## 2025-07-07 ENCOUNTER — OFFICE VISIT (OUTPATIENT)
Dept: URGENT CARE | Facility: CLINIC | Age: 35
End: 2025-07-07
Payer: MEDICAID

## 2025-07-07 VITALS
HEART RATE: 86 BPM | SYSTOLIC BLOOD PRESSURE: 127 MMHG | TEMPERATURE: 99 F | BODY MASS INDEX: 38.82 KG/M2 | OXYGEN SATURATION: 100 % | RESPIRATION RATE: 16 BRPM | DIASTOLIC BLOOD PRESSURE: 82 MMHG | WEIGHT: 233 LBS | HEIGHT: 65 IN

## 2025-07-07 DIAGNOSIS — R51.9 NONINTRACTABLE HEADACHE, UNSPECIFIED CHRONICITY PATTERN, UNSPECIFIED HEADACHE TYPE: Primary | ICD-10-CM

## 2025-07-07 PROCEDURE — 99213 OFFICE O/P EST LOW 20 MIN: CPT | Mod: S$PBB,,, | Performed by: FAMILY MEDICINE

## 2025-07-07 PROCEDURE — 99215 OFFICE O/P EST HI 40 MIN: CPT | Mod: PBBFAC | Performed by: FAMILY MEDICINE

## 2025-07-07 RX ORDER — HYDROCODONE BITARTRATE AND ACETAMINOPHEN 5; 325 MG/1; MG/1
1 TABLET ORAL EVERY 6 HOURS PRN
Qty: 15 TABLET | Refills: 0 | Status: SHIPPED | OUTPATIENT
Start: 2025-07-07

## 2025-07-07 RX ORDER — HYDROCORTISONE 25 MG/G
CREAM TOPICAL
COMMUNITY
Start: 2025-03-12

## 2025-07-07 RX ORDER — ALBUTEROL SULFATE AND BUDESONIDE 90; 80 UG/1; UG/1
2 AEROSOL, METERED RESPIRATORY (INHALATION) EVERY 4 HOURS PRN
COMMUNITY
Start: 2025-03-21

## 2025-07-07 RX ORDER — TRIAMCINOLONE ACETONIDE 1 MG/G
CREAM TOPICAL
COMMUNITY
Start: 2025-03-12

## 2025-07-07 NOTE — PROGRESS NOTES
"Subjective:       Patient ID: Sampson Ly is a 34 y.o. female.    Vitals:  height is 5' 5" (1.651 m) and weight is 105.7 kg (233 lb). Her temperature is 98.6 °F (37 °C). Her blood pressure is 127/82 and her pulse is 86. Her respiration is 16 and oxygen saturation is 100%.     Chief Complaint: Headache (X 3 days.)    Patient states a history of migraines, having a frontal headache for 2-3 days, off and on, no associated neuro symptoms.  No photo or phonophobia.  No nausea or vomiting.  Has had no fever.  No ENT symptoms.  States Fioricet not helping.  History of multiple allergies.  Has tolerated opioids in the past, states Norco helps.   reviewed    All other systems are negative    Chart reviewed    Objective:   Physical Exam   Constitutional: She is oriented to person, place, and time. She appears well-developed.  Non-toxic appearance. She does not appear ill. No distress.   HENT:   Head: Atraumatic.   Eyes: Conjunctivae are normal. Pupils are equal, round, and reactive to light. Extraocular movement intact   Neck: Neck supple. No neck rigidity present.   Abdominal: Normal appearance.   Musculoskeletal:      Cervical back: She exhibits no tenderness.   Lymphadenopathy:     She has no cervical adenopathy.   Neurological: no focal deficit. She is alert and oriented to person, place, and time. She displays no weakness. No cranial nerve deficit or sensory deficit. Gait normal.   Skin: Skin is not diaphoretic. Capillary refill takes less than 2 seconds.   Psychiatric: Her behavior is normal. Mood normal.   Nursing note and vitals reviewed.        Assessment:     1. Nonintractable headache, unspecified chronicity pattern, unspecified headache type            Plan:     Patient with multiple allergies.  Has tolerated Norco and Percocet multiple times in the past.  Will prescribe a few Norco after reviewing  and discussing opioid precautions, especially in light of documented anaphylactic reaction to " tramadol.  We had a discussion about this and she assured me that she had tolerated Norco in the past.  She will continue Fioricet if needed.  Provided work excuse for today as requested.  ER precautions.    Nonintractable headache, unspecified chronicity pattern, unspecified headache type  -     HYDROcodone-acetaminophen (NORCO) 5-325 mg per tablet; Take 1 tablet by mouth every 6 (six) hours as needed for Pain.  Dispense: 15 tablet; Refill: 0        Portions of this report were dictated using voice recognition software. Content is subject to voice recognition errors

## 2025-07-07 NOTE — LETTER
July 7, 2025      [facility]  [facility] [facility] [address]  Phone: [phone]       Patient: Sampson Ly   YOB: 1990  Date of Visit: 07/07/2025    To Whom It May Concern:    Meghana Ly  was at [facility] on 07/07/2025. The patient may return to work/school on 7/8/2025 with no restrictions. If you have any questions or concerns, or if I can be of further assistance, please do not hesitate to contact me.    Sincerely,    PREMA Vaughan MD

## (undated) DEVICE — HANDLE DEVON RIGID OR LIGHT

## (undated) DEVICE — SYR 10CC LUER LOCK

## (undated) DEVICE — CORD BIPOLAR 12 FOOT

## (undated) DEVICE — BLADE TRICUT

## (undated) DEVICE — SEE MEDLINE ITEM 157117

## (undated) DEVICE — NDL 27G X 1 1/4

## (undated) DEVICE — SUT CHROMIC 3-0 SH 27IN GUT

## (undated) DEVICE — Device

## (undated) DEVICE — PACKING NASOPORE NASAL STD 8CM

## (undated) DEVICE — BLADE INFERIOR TURBINATE 2MM

## (undated) DEVICE — GLOVE PROTEXIS BLUE LATEX 7

## (undated) DEVICE — GLOVE PROTEXIS PI SYN SURG 7.5

## (undated) DEVICE — GLOVE PROTEXIS LTX MICRO  7

## (undated) DEVICE — SUCTION COAGULATOR 10FR 6IN

## (undated) DEVICE — GAUZE SPONGE 4X4 12PLY

## (undated) DEVICE — GLOVE PROTEXIS BLUE LATEX 7.5

## (undated) DEVICE — CONTAINER SPECIMEN 4.5OZ

## (undated) DEVICE — STAPLER SKIN ROTATING HEAD

## (undated) DEVICE — SUT PLN GUT 4-0 SC-1SC-1 1

## (undated) DEVICE — KIT ANTIFOG W/SPONG & FLUID

## (undated) DEVICE — TRACKER ENT INSTRUMENT

## (undated) DEVICE — SOL NORMAL USPCA 0.9%

## (undated) DEVICE — BLADE ENT RAD 60 4.0MM

## (undated) DEVICE — NDL ECLIPSE SAFETY 18GX1-1/2IN

## (undated) DEVICE — CONTAINER FORMALIN PREFILLED

## (undated) DEVICE — GLOVE PROTEXIS PF LATEX 7.0

## (undated) DEVICE — GLOVE PROTEXIS HYDROGEL SZ7.5

## (undated) DEVICE — SUT 4-0 CHROMIC GUT / RB1

## (undated) DEVICE — MANIFOLD 4 PORT

## (undated) DEVICE — SPONGE LAP STRL 18X18IN

## (undated) DEVICE — TUBING MEDI-VAC 20FT .25IN

## (undated) DEVICE — TRACKER PATIENT NON INVASIVE

## (undated) DEVICE — NEUROSPONGES

## (undated) DEVICE — GLOVE PROTEXIS LTX MICRO 6

## (undated) DEVICE — SOL NACL IRR 1000ML BTL